# Patient Record
Sex: FEMALE | Race: ASIAN | NOT HISPANIC OR LATINO | Employment: UNEMPLOYED | URBAN - METROPOLITAN AREA
[De-identification: names, ages, dates, MRNs, and addresses within clinical notes are randomized per-mention and may not be internally consistent; named-entity substitution may affect disease eponyms.]

---

## 2019-04-05 ENCOUNTER — COMMUNICATION - HEALTHEAST (OUTPATIENT)
Dept: INFUSION THERAPY | Facility: HOSPITAL | Age: 24
End: 2019-04-05

## 2019-04-22 ENCOUNTER — OFFICE VISIT - HEALTHEAST (OUTPATIENT)
Dept: ONCOLOGY | Facility: HOSPITAL | Age: 24
End: 2019-04-22

## 2019-04-22 ENCOUNTER — COMMUNICATION - HEALTHEAST (OUTPATIENT)
Dept: TELEHEALTH | Facility: CLINIC | Age: 24
End: 2019-04-22

## 2019-04-22 ENCOUNTER — AMBULATORY - HEALTHEAST (OUTPATIENT)
Dept: INFUSION THERAPY | Facility: HOSPITAL | Age: 24
End: 2019-04-22

## 2019-04-22 DIAGNOSIS — D50.9 MICROCYTIC ANEMIA: ICD-10-CM

## 2019-04-22 LAB
ALBUMIN SERPL-MCNC: 3.6 G/DL (ref 3.5–5)
ALP SERPL-CCNC: 89 U/L (ref 45–120)
ALT SERPL W P-5'-P-CCNC: 9 U/L (ref 0–45)
ANION GAP SERPL CALCULATED.3IONS-SCNC: 11 MMOL/L (ref 5–18)
AST SERPL W P-5'-P-CCNC: 16 U/L (ref 0–40)
BASOPHILS # BLD AUTO: 0 THOU/UL (ref 0–0.2)
BASOPHILS # BLD AUTO: 0 THOU/UL (ref 0–0.2)
BASOPHILS NFR BLD AUTO: 0 % (ref 0–2)
BASOPHILS NFR BLD AUTO: 0 % (ref 0–2)
BILIRUB SERPL-MCNC: 0.3 MG/DL (ref 0–1)
BUN SERPL-MCNC: 7 MG/DL (ref 8–22)
CALCIUM SERPL-MCNC: 9.5 MG/DL (ref 8.5–10.5)
CHLORIDE BLD-SCNC: 105 MMOL/L (ref 98–107)
CO2 SERPL-SCNC: 21 MMOL/L (ref 22–31)
CREAT SERPL-MCNC: 0.56 MG/DL (ref 0.6–1.1)
EOSINOPHIL # BLD AUTO: 0.1 THOU/UL (ref 0–0.4)
EOSINOPHIL # BLD AUTO: 0.1 THOU/UL (ref 0–0.4)
EOSINOPHIL NFR BLD AUTO: 1 % (ref 0–6)
EOSINOPHIL NFR BLD AUTO: 1 % (ref 0–6)
ERYTHROCYTE [DISTWIDTH] IN BLOOD BY AUTOMATED COUNT: 15.9 % (ref 11–14.5)
ERYTHROCYTE [DISTWIDTH] IN BLOOD BY AUTOMATED COUNT: 15.9 % (ref 11–14.5)
FERRITIN SERPL-MCNC: 27 NG/ML (ref 10–130)
FOLATE SERPL-MCNC: 13 NG/ML
GFR SERPL CREATININE-BSD FRML MDRD: >60 ML/MIN/1.73M2
GLUCOSE BLD-MCNC: 83 MG/DL (ref 70–125)
HCT VFR BLD AUTO: 31.7 % (ref 35–47)
HCT VFR BLD AUTO: 31.7 % (ref 35–47)
HGB BLD-MCNC: 9.8 G/DL (ref 12–16)
HGB BLD-MCNC: 9.8 G/DL (ref 12–16)
IRON SATN MFR SERPL: 19 % (ref 20–50)
IRON SERPL-MCNC: 86 UG/DL (ref 42–175)
LYMPHOCYTES # BLD AUTO: 1.6 THOU/UL (ref 0.8–4.4)
LYMPHOCYTES # BLD AUTO: 1.6 THOU/UL (ref 0.8–4.4)
LYMPHOCYTES NFR BLD AUTO: 15 % (ref 20–40)
LYMPHOCYTES NFR BLD AUTO: 15 % (ref 20–40)
MCH RBC QN AUTO: 23.3 PG (ref 27–34)
MCH RBC QN AUTO: 23.3 PG (ref 27–34)
MCHC RBC AUTO-ENTMCNC: 30.9 G/DL (ref 32–36)
MCHC RBC AUTO-ENTMCNC: 30.9 G/DL (ref 32–36)
MCV RBC AUTO: 76 FL (ref 80–100)
MCV RBC AUTO: 76 FL (ref 80–100)
MONOCYTES # BLD AUTO: 0.6 THOU/UL (ref 0–0.9)
MONOCYTES # BLD AUTO: 0.6 THOU/UL (ref 0–0.9)
MONOCYTES NFR BLD AUTO: 6 % (ref 2–10)
MONOCYTES NFR BLD AUTO: 6 % (ref 2–10)
NEUTROPHILS # BLD AUTO: 7.9 THOU/UL (ref 2–7.7)
NEUTROPHILS # BLD AUTO: 7.9 THOU/UL (ref 2–7.7)
NEUTROPHILS NFR BLD AUTO: 78 % (ref 50–70)
NEUTROPHILS NFR BLD AUTO: 78 % (ref 50–70)
PATH REPORT.MICROSCOPIC SPEC OTHER STN: ABNORMAL
PLATELET # BLD AUTO: 241 THOU/UL (ref 140–440)
PLATELET # BLD AUTO: 241 THOU/UL (ref 140–440)
PMV BLD AUTO: 9.3 FL (ref 8.5–12.5)
PMV BLD AUTO: 9.3 FL (ref 8.5–12.5)
POTASSIUM BLD-SCNC: 4 MMOL/L (ref 3.5–5)
PROT SERPL-MCNC: 7.4 G/DL (ref 6–8)
RBC # BLD AUTO: 4.2 MILL/UL (ref 3.8–5.4)
RBC # BLD AUTO: 4.2 MILL/UL (ref 3.8–5.4)
RETICS # AUTO: 0.12 MILL/UL (ref 0.01–0.11)
SODIUM SERPL-SCNC: 137 MMOL/L (ref 136–145)
TIBC SERPL-MCNC: 446 UG/DL (ref 313–563)
TRANSFERRIN SERPL-MCNC: 357 MG/DL (ref 212–360)
VIT B12 SERPL-MCNC: 358 PG/ML (ref 213–816)
WBC: 10.2 THOU/UL (ref 4–11)
WBC: 10.2 THOU/UL (ref 4–11)

## 2019-04-22 RX ORDER — FERROUS SULFATE 325(65) MG
1 TABLET ORAL DAILY
Refills: 3 | Status: SHIPPED | COMMUNITY
Start: 2019-03-05 | End: 2024-02-07

## 2019-04-22 ASSESSMENT — MIFFLIN-ST. JEOR: SCORE: 1070

## 2019-04-23 LAB
LAB AP CHARGES (HE HISTORICAL CONVERSION): NORMAL
PATH REPORT.COMMENTS IMP SPEC: NORMAL
PATH REPORT.COMMENTS IMP SPEC: NORMAL
PATH REPORT.FINAL DX SPEC: NORMAL
PATH REPORT.RELEVANT HX SPEC: NORMAL

## 2019-04-24 ENCOUNTER — AMBULATORY - HEALTHEAST (OUTPATIENT)
Dept: MATERNAL FETAL MEDICINE | Facility: HOSPITAL | Age: 24
End: 2019-04-24

## 2019-04-24 DIAGNOSIS — O26.90 PREGNANCY, ANTEPARTUM, COMPLICATIONS: ICD-10-CM

## 2019-04-24 LAB
HEMOGLOBIN A2 QUANTITATION: 2.8 % (ref 2.2–3.5)
HEMOGLOBIN ELECTROPHRESIS: NORMAL
HEMOGLOBIN F QUANTITATION: <0.8 % (ref 0–2)
PATH ICD:: NORMAL
REVIEWING PATHOLOGIST: NORMAL

## 2019-05-03 ENCOUNTER — AMBULATORY - HEALTHEAST (OUTPATIENT)
Dept: MATERNAL FETAL MEDICINE | Facility: HOSPITAL | Age: 24
End: 2019-05-03

## 2019-05-08 ENCOUNTER — RECORDS - HEALTHEAST (OUTPATIENT)
Dept: ADMINISTRATIVE | Facility: OTHER | Age: 24
End: 2019-05-08

## 2019-05-08 ENCOUNTER — OFFICE VISIT - HEALTHEAST (OUTPATIENT)
Dept: MATERNAL FETAL MEDICINE | Facility: HOSPITAL | Age: 24
End: 2019-05-08

## 2019-05-09 ENCOUNTER — OFFICE VISIT - HEALTHEAST (OUTPATIENT)
Dept: ONCOLOGY | Facility: HOSPITAL | Age: 24
End: 2019-05-09

## 2019-05-09 DIAGNOSIS — D56.3 ALPHA THALASSEMIA MINOR: ICD-10-CM

## 2019-06-13 ENCOUNTER — OFFICE VISIT - HEALTHEAST (OUTPATIENT)
Dept: FAMILY MEDICINE | Facility: CLINIC | Age: 24
End: 2019-06-13

## 2019-06-13 DIAGNOSIS — R59.1 LYMPHADENOPATHY: ICD-10-CM

## 2019-06-13 DIAGNOSIS — J30.2 SEASONAL ALLERGIES: ICD-10-CM

## 2019-12-14 ENCOUNTER — OFFICE VISIT - HEALTHEAST (OUTPATIENT)
Dept: FAMILY MEDICINE | Facility: CLINIC | Age: 24
End: 2019-12-14

## 2019-12-14 DIAGNOSIS — H92.01 RIGHT EAR PAIN: ICD-10-CM

## 2019-12-14 DIAGNOSIS — R10.9 FLANK PAIN: ICD-10-CM

## 2019-12-14 DIAGNOSIS — R07.81 PLEURITIC CHEST PAIN: ICD-10-CM

## 2019-12-14 LAB
ALBUMIN UR-MCNC: NEGATIVE MG/DL
APPEARANCE UR: CLEAR
BILIRUB UR QL STRIP: NEGATIVE
COLOR UR AUTO: YELLOW
GLUCOSE UR STRIP-MCNC: NEGATIVE MG/DL
HCG UR QL: NEGATIVE
HGB UR QL STRIP: NEGATIVE
KETONES UR STRIP-MCNC: NEGATIVE MG/DL
LEUKOCYTE ESTERASE UR QL STRIP: NEGATIVE
NITRATE UR QL: NEGATIVE
PH UR STRIP: 7 [PH] (ref 5–8)
SP GR UR STRIP: 1.02 (ref 1–1.03)
UROBILINOGEN UR STRIP-ACNC: NORMAL

## 2020-02-11 ENCOUNTER — RECORDS - HEALTHEAST (OUTPATIENT)
Dept: LAB | Facility: CLINIC | Age: 25
End: 2020-02-11

## 2020-02-12 LAB
BASOPHILS # BLD AUTO: 0 THOU/UL (ref 0–0.2)
BASOPHILS NFR BLD AUTO: 1 % (ref 0–2)
EOSINOPHIL # BLD AUTO: 0.1 THOU/UL (ref 0–0.4)
EOSINOPHIL NFR BLD AUTO: 1 % (ref 0–6)
ERYTHROCYTE [DISTWIDTH] IN BLOOD BY AUTOMATED COUNT: 17.6 % (ref 11–14.5)
HCT VFR BLD AUTO: 38.4 % (ref 35–47)
HGB BLD-MCNC: 11.3 G/DL (ref 12–16)
HIV 1+2 AB+HIV1 P24 AG SERPL QL IA: NEGATIVE
LYMPHOCYTES # BLD AUTO: 1.7 THOU/UL (ref 0.8–4.4)
LYMPHOCYTES NFR BLD AUTO: 24 % (ref 20–40)
MCH RBC QN AUTO: 20.9 PG (ref 27–34)
MCHC RBC AUTO-ENTMCNC: 29.4 G/DL (ref 32–36)
MCV RBC AUTO: 71 FL (ref 80–100)
MONOCYTES # BLD AUTO: 0.6 THOU/UL (ref 0–0.9)
MONOCYTES NFR BLD AUTO: 9 % (ref 2–10)
NEUTROPHILS # BLD AUTO: 4.6 THOU/UL (ref 2–7.7)
NEUTROPHILS NFR BLD AUTO: 65 % (ref 50–70)
PLATELET # BLD AUTO: 295 THOU/UL (ref 140–440)
PMV BLD AUTO: 10.5 FL (ref 8.5–12.5)
RBC # BLD AUTO: 5.41 MILL/UL (ref 3.8–5.4)
WBC: 7.1 THOU/UL (ref 4–11)

## 2020-02-13 LAB
ABO/RH(D): NORMAL
ABORH REPEAT: NORMAL
ANTIBODY SCREEN: NEGATIVE
BACTERIA SPEC CULT: NO GROWTH
C TRACH DNA SPEC QL PROBE+SIG AMP: NEGATIVE
HBV SURFACE AG SERPL QL IA: NEGATIVE
N GONORRHOEA DNA SPEC QL NAA+PROBE: NEGATIVE
RUBV IGG SERPL QL IA: POSITIVE
T PALLIDUM AB SER QL: NEGATIVE

## 2020-06-24 ENCOUNTER — RECORDS - HEALTHEAST (OUTPATIENT)
Dept: LAB | Facility: CLINIC | Age: 25
End: 2020-06-24

## 2020-06-25 LAB — T PALLIDUM AB SER QL: NEGATIVE

## 2020-08-09 ENCOUNTER — HOSPITAL ENCOUNTER (OUTPATIENT)
Dept: OBGYN | Facility: HOSPITAL | Age: 25
Discharge: HOME | End: 2020-08-09
Attending: FAMILY MEDICINE | Admitting: FAMILY MEDICINE

## 2020-08-09 LAB
ALBUMIN UR-MCNC: ABNORMAL MG/DL
APPEARANCE UR: CLEAR
BACTERIA #/AREA URNS HPF: ABNORMAL HPF
BILIRUB UR QL STRIP: NEGATIVE
COLOR UR AUTO: YELLOW
GLUCOSE UR STRIP-MCNC: NEGATIVE MG/DL
HGB UR QL STRIP: NEGATIVE
KETONES UR STRIP-MCNC: NEGATIVE MG/DL
LEUKOCYTE ESTERASE UR QL STRIP: ABNORMAL
NITRATE UR QL: NEGATIVE
PH UR STRIP: 7.5 [PH] (ref 4.5–8)
RBC #/AREA URNS AUTO: ABNORMAL HPF
SP GR UR STRIP: 1.01 (ref 1–1.03)
SQUAMOUS #/AREA URNS AUTO: ABNORMAL LPF
UROBILINOGEN UR STRIP-ACNC: ABNORMAL
WBC #/AREA URNS AUTO: ABNORMAL HPF

## 2020-08-09 ASSESSMENT — MIFFLIN-ST. JEOR: SCORE: 1105.16

## 2020-08-10 LAB
ALLERGIC TO PENICILLIN: NORMAL
BACTERIA SPEC CULT: NO GROWTH
GP B STREP DNA SPEC QL NAA+PROBE: NEGATIVE

## 2020-08-12 ENCOUNTER — RECORDS - HEALTHEAST (OUTPATIENT)
Dept: ADMINISTRATIVE | Facility: OTHER | Age: 25
End: 2020-08-12

## 2020-08-18 ENCOUNTER — RECORDS - HEALTHEAST (OUTPATIENT)
Dept: LAB | Facility: CLINIC | Age: 25
End: 2020-08-18

## 2020-08-20 LAB
ALLERGIC TO PENICILLIN: NORMAL
GP B STREP DNA SPEC QL NAA+PROBE: NEGATIVE

## 2020-08-31 ENCOUNTER — COMMUNICATION - HEALTHEAST (OUTPATIENT)
Dept: SCHEDULING | Facility: CLINIC | Age: 25
End: 2020-08-31

## 2021-01-01 ENCOUNTER — TRANSFERRED RECORDS (OUTPATIENT)
Dept: MULTI SPECIALTY CLINIC | Facility: CLINIC | Age: 26
End: 2021-01-01

## 2021-01-01 LAB — PAP SMEAR - HIM PATIENT REPORTED: NORMAL

## 2021-05-27 NOTE — TELEPHONE ENCOUNTER
With assistance of [facility]  Krishna # [id number] telephoned and spoke with male identifying self as Kahlil Emmanuel, patient spouse.  Benign hematology consult for alpha thalassemia in pregnancy ordered [facility]      Appointment created for Monday, 4/22/19 at 3:00pm (2:30 arrival) with Dr. Huddleston.  Welcomed letter, health history form, medications/allergies list, and person-to-person communication forms will be sent in [place] mail to home address. Kori Jameson RN

## 2021-05-28 NOTE — PROGRESS NOTES
[facility] Hematology and Oncology Progress Note    Patient: Shayna Jerry  MRN: [id number]  Date of Service: 05/09/2019        Reason for Visit    Chief Complaint   Patient presents with     Benign Hematology     Microcytic anemia       Assessment and Plan    Alpha thalassemia minor  Patient is 7 months pregnant    Lab work results are reviewed and showed no evidence of B12, folate or iron deficiency.  Complete metabolic profile is normal.  Reticulocyte count is elevated.  Hemoglobin electrophoresis is consistent with alpha thalassemia minor.    This explains patient's microcytic anemia and her hemoglobin is likely to remain between 9 and 10 g/dL.  No specific intervention is required during or after her pregnancy.    Explained to patient and  that their kids could be at risk for more significant types of alpha thalassemia especially if the  has a carrier state.  Therefore would recommend referral to genetics for further evaluation and for future prenatal counseling.    No follow-up recommended in hematology.    Plan: Referral to genetics  No follow-up in hematology          ECOG Performance   ECOG Performance Status: 1    Distress Assessment  Distress Assessment Score: No distress    Pain           Problem List    1. Alpha thalassemia minor  Ambulatory referral to Genetics        CC: Provider, No Primary Care    ______________________________________________________________________________    History of Present Illness    Ms. Shayna Jerry returns for follow-up.  She was seen 2 weeks ago.  She has had blood work to evaluate anemia and is here to review results.  Doing fine with no new problems.    Pain Status  Currently in Pain: No/denies    Review of Systems    Constitutional  Constitutional (WDL): Exceptions to WDL  Fatigue: Fatigue relieved by rest  Neurosensory  Neurosensory (WDL): All neurosensory elements are within defined limits  Cardiovascular  Cardiovascular (WDL): All cardiovascular elements are  within defined limits  Pulmonary  Respiratory (WDL): Within Defined Limits(Reports sore throat. A lot of mucous. Able to cough it up and out. )  Gastrointestinal  Gastrointestinal (WDL): Exceptions to WDL  Constipation: Occasional or intermittent symptoms, occasional use of stool softeners, laxatives, dietary modification, or enema  Genitourinary  Genitourinary (WDL): All genitourinary elements are within defined limits  Integumentary  Integumentary (WDL): All integumentary elements are within defined limits  Patient Coping  Patient Coping: Accepting  Distress Assessment  Distress Assessment Score: No distress  Accompanied by  Accompanied by: Family Member    Past History  History reviewed. No pertinent past medical history.    History reviewed. No pertinent surgical history.    Physical Exam    Recent Vitals 5/9/2019   Height -   Weight 98 lbs 11 oz   BSA (m2) 1.34 m2   /59   Pulse 84   Temp 97.9   Temp src 1   SpO2 99       GENERAL: Alert and oriented. Seated comfortably. In no distress.    HEAD: Atraumatic and normocephalic.  Has a full head of hair.    EYES: ABEL, EOMI.  No pallor.  No icterus.    Oral cavity: no mucosal lesion or tonsillar enlargement.    NECK: supple. JVP normal.  No thyroid enlargement.    LYMPH NODES: No palpable, cervical, axillary or inguinal lymphadenopathy.    CHEST: clear to auscultation bilaterally.  Resonant to percussion throughout bilaterally.  Symmetrical breath movements bilaterally.    CVS: S1 and S2 are heard. Regular rate and rhythm.  No murmur or gallop or rub heard.    ABDOMEN: Soft. Not tender. Not distended.  No palpable hepatomegaly or splenomegaly.  No other mass palpable.  Bowel sounds heard.    EXTREMITIES: Warm.  No peripheral edema.    SKIN: no rash, or bruising or purpura.        Lab Results    No results found for this or any previous visit (from the past 168 hour(s)).    Imaging    No results found.      Signed by: Christopher Huddleston MD

## 2021-05-28 NOTE — CONSULTS
[facility] Hematology and Oncology Consult Note    Patient: Shayna THOMPSON MI Claritza  MRN: [id number]  Date of Service: 04/22/2019        Reason for Visit    I was consulted by Lana Ortez regarding alpha thalassemia trait    Assessment/Plan    Microcytic anemia, probable thalassemia  Patient is 7 months pregnant    Patient had lab work in March showing microcytic anemia.  Hemoglobin 9.1 and MCV 71 with normal RBC count, white blood cell count and platelet count suggesting likely thalassemia.    Patient has no history of transfusions, jaundice, bone problems, endocrine, cardiac or pulmonary or liver issues.  No history of blood clots or skin ulcers.     denies personal or family history of anemia or thalassemia.    Will do anemia workup to rule out other nutritional causes for her anemia as her anemia does seem more pronounced than I would expect with the MCV.  We will see patient back in a couple of weeks to review results and make further recommendations.    If she is confirmed to have thalassemia then referral to genetics for counseling should be considered.    Patient and 's questions are answered.    Plan:  Check CBC, CMP, reticulocyte count, iron studies, B12, folate and morphology  We will also do hemoglobin electrophoresis  Follow-up in 2 weeks to review      ECOG Performance   ECOG Performance Status: 1  Distress Assessment  Distress Assessment Score: No distress        Problem List    1. Microcytic anemia  HM1(CBC and Differential)    Morphology,Smear Review (MORP)    Reticulocytes    Ferritin    Iron and Transferrin Iron Binding Capacity    Folate, Serum    Vitamin B12    Comprehensive Metabolic Panel    Hemoglobinopathy/Thalassemia Titus    Comprehensive Metabolic Panel    Vitamin B12    Folate, Serum    Iron and Transferrin Iron Binding Capacity    Ferritin    Reticulocytes    HM1(CBC and Differential)    HM1 (CBC with Diff)           CC: Provider, No Primary  Care      ______________________________________________________________________________      Staging History    Cancer Staging  No matching staging information was found for the patient.    History    Ms. Corral NO MI Claritza patient is a very pleasant 23-year-old who is 7 months pregnant and referred for possible alpha thalassemia trait.  Had OB appointment back in March with lab work showing hemoglobin of 9.1 and MCV of 71 with normal WBC count and platelet count.  Originally from [facility] and so there is concern for thalassemia and she is referred.    Patient has no previous knowledge of a diagnosis of thalassemia or anemia.  Has never required blood transfusion.  Denies any family history of thalassemia or anyone requiring transfusions.  Denies jaundice.  Has some achiness in the bones.  No history of heart, lung or liver problems.  No history of blood clots.  No history of skin ulcerations.    Past medical history is negative.  No previous surgeries or hospitalizations.    She is  and accompanied by her  today.  She is about 7 months pregnant currently.    Does not smoke or drink.    Does have some general muscle weakness and some shortness of breath and lightheadedness on occasion.  Describes heat and cold intolerance.  Some nausea.  Difficulty laying flat.  Occasional headaches and dizziness and numbness.  Some difficulty with sleeping.    Past History  History reviewed. No pertinent past medical history.  History reviewed. No pertinent surgical history.  History reviewed. No pertinent family history.  Social History     Socioeconomic History     Marital status: Single     Spouse name: None     Number of children: None     Years of education: None     Highest education level: None   Occupational History     None   Social Needs     Financial resource strain: None     Food insecurity:     Worry: None     Inability: None     Transportation needs:     Medical: None     Non-medical: None   Tobacco Use      Smoking status: Never Smoker     Smokeless tobacco: Never Used   Substance and Sexual Activity     Alcohol use: Not Currently     Frequency: Never     Drug use: Never     Sexual activity: Not Currently   Lifestyle     Physical activity:     Days per week: None     Minutes per session: None     Stress: None   Relationships     Social connections:     Talks on phone: None     Gets together: None     Attends Worship service: None     Active member of club or organization: None     Attends meetings of clubs or organizations: None     Relationship status: None     Intimate partner violence:     Fear of current or ex partner: None     Emotionally abused: None     Physically abused: None     Forced sexual activity: None   Other Topics Concern     None   Social History Narrative     None     Allergies    No Known Allergies    Review of Systems    General  General (WDL): Exceptions to WDL  Fatigue: Yes - Chronic (Greater than 3 months)  Generalized Muscle Weakness: Yes - Recent (Less than 3 months)  ENT  ENT (WDL): Exceptions to WDL  Pain/Pressure in ears: Yes - Chronic (Greater than 3 months)  Sinus Congestion/Drainage: Yes - Chronic (Greater than 3 months)  Sore Throat: Yes - Chronic (Greater than 3 months)  Dental Problems: Yes - Chronic (Greater than 3 months)  Respiratory  Respiratory (WDL): Exceptions to WDL  Dyspnea: Yes - Recent (Less than 3 months)  Cardiovascular  Cardiovascular (WDL): Exceptions to WDL  Lightheadedness: Yes - Recent (Less than 3 months)  Endocrine  Endocrine (WDL): Exceptions to WDL  Heat Intolerance: Yes - Recent (Less than 3 months)  Cold Intolerance: Yes - Recent (Less than 3 months)  Excessive Urination: Yes - Chronic (Greater than 3 months)  Gastrointestinal  Gastrointestinal (WDL): Exceptions to WDL  Constipation: Yes - Chronic (Greater than 3 months)  Blood from rectum: Yes - Recent (Less than 3 months)  Nausea and Vomiting: Yes - Recent (Less than 3 months)  Abdominal Pain: Yes - Chronic  "(Greater than 3 months)  Musculoskeletal  Musculoskeletal (WDL): Exceptions to WDL  Range of Motion Limitation: Yes - Recent (Less than 3 months)  Joint pain: Yes - Chronic (Greater than 3 months)  Back Pain: Yes - Chronic (Greater than 3 months)  Activity Assistance: Yes - Recent (Less than 3 months)  Difficulty to lie flat for more than 30 minutes: Yes - Recent (Less than 3 months)  Pain interfering with walking: Yes - Recent (Less than 3 months)  Muscle pain or stiffness: Yes - Recent (Less than 3 months)  Neurological  Neurological (WDL): Exceptions to WDL  Headaches: Yes - Recent (Less than 3 months)  Dominant Hand: Right  Numbness and/or tingling: Yes - Recent (Less than 3 months)  Psychological/Emotional  Psychological/Emotional (WDL): Exceptions to WDL  Insomnia: Yes - Recent (Less than 3 months)  Daytime sleepiness: Yes - Recent (Less than 3 months)  Hematological/Lymphatic  Hematological/Lymphatic (WDL): Exceptions to WDL  Bleeding gums: Yes - Chronic (Greater than 3 months)  Swollen glands: Yes - Recent (Less than 3 months)  Dermatological  Dermatologic (WDL): Exceptions to WDL  Hair Loss: Yes - Recent (Less than 3 months)  Genitourinary/Reproductive  Genitourinary/Reproductive (WDL): Exceptions to WDL  Urinary Frequency: Yes - Chronic (Greater than 3 months)  Urination more than 2 times a night: Yes - Chronic (Greater than 3 months)  Urinary Urgency: Yes - Chronic (Greater than 3 months)  Reproductive (Females only)  Menstrual irritation or increase in discharge: Yes  Age at start of periods: 15  Last menstural cycle: 10/16/18  Number of pregnancies: 1  Age at first pregnancy: 23  Patient Pregnant?: Yes  Is the patient trying to get pregnant?: Yes  Is the patient on birth control: No  Pain  Currently in Pain: No/denies    Physical Exam    Recent Vitals 4/22/2019   Height 4' 8.5\"   Weight 99 lbs   BSA (m2) 1.34 m2   BP 90/60   Pulse 88   Temp 98   Temp src 1   SpO2 100       GENERAL: Alert and oriented. " Seated comfortably. In no distress.    HEAD: Atraumatic and normocephalic.  Has a full head of hair.    EYES: ABEL, EOMI.  No pallor.  No icterus.    Oral cavity: no mucosal lesion or tonsillar enlargement.    NECK: supple. JVP normal.  No thyroid enlargement.    LYMPH NODES: No palpable, cervical, axillary or inguinal lymphadenopathy.    CHEST: clear to auscultation bilaterally.  Resonant to percussion throughout bilaterally.  Symmetrical breath movements bilaterally.    CVS: S1 and S2 are heard. Regular rate and rhythm.  No murmur or gallop or rub heard.    ABDOMEN: Soft. Not tender. Not distended.  No palpable hepatomegaly or splenomegaly.  No other mass palpable.  Bowel sounds heard.    EXTREMITIES: Warm.  No peripheral edema.    SKIN: no rash, or bruising or purpura.    CNS: Nonfocal    Lab Results    CBC shows white count 9.3 hemoglobin 9.1 MCV 71 platelets 257 with normal differential.  Imaging Results    No results found.      Signed by: Christopher Huddleston MD

## 2021-05-29 NOTE — PROGRESS NOTES
Assessment:     1. Lymphadenopathy     2. Seasonal allergies  loratadine (CLARITIN) 10 mg tablet          Plan:     Differential diagnosis include but not limited to lymphadenopathy, seasonal allergies, foreign checked in the eye.  Discussed with the patient on exam I did not find any indication for foreign checked in the eye.  Her symptoms are more consistent with seasonal allergies.  Will try loratadine 10 mg daily.  Advised patient to monitor for worsening symptoms.  For the lymphadenopathy discussed with the patient this is possibly due to a viral infection therefore to monitor for worsening symptoms.  I discussed red flag symptoms, return precautions to clinic/ER and follow up care with patient/parent.  Expected clinical course, symptomatic cares advised. Questions answered. Patient/parent amenable with plan.       Subjective:       23 y.o. female presents for evaluation of swelling of the eyes.  Patient reports that it started on the right side and now is in both eyes.  He has been experiencing symptoms for about 2 or 3 days.  She denies any pain, she admits to itching and feels like there saw.  She has been rubbing her eyes.  She has not taken any medications for her symptoms.  She used some eyedrops this morning but does not feel like it give her any symptom relief.  She has never had any allergies.  Because of the swelling she is not able to see well.  Denies any nausea, vomiting, diarrhea.  Patient is pregnant about 32 weeks.  Patient also admits that she feels like she has a lump under left side of her cheek.  This came on about the same times her eyes that are giving her problems.      The following portions of the patient's history were reviewed and updated as appropriate: allergies, current medications, past family history, past medical history, past social history, past surgical history and problem list.    Review of Systems  A 12 point comprehensive review of systems was negative except as noted.       Objective:      BP 96/63   Pulse 67   Temp 98  F (36.7  C) (Oral)   Resp 18   Wt 102 lb (46.3 kg)   LMP 10/16/2018   SpO2 99%   BMI 22.46 kg/m    General appearance: alert, appears stated age, cooperative and moderate distress  Head: Normocephalic, without obvious abnormality, atraumatic  Eyes: positive findings: eyelids/periorbital: Swollen eyelids  Ears: normal TM's and external ear canals both ears  Nose: Nares normal. Septum midline. Mucosa normal. No drainage or sinus tenderness.  Throat: lips, mucosa, and tongue normal; teeth and gums normal  Neck: no adenopathy, no carotid bruit, no JVD, supple, symmetrical, trachea midline and thyroid not enlarged, symmetric, no tenderness/mass/nodules  Lungs: clear to auscultation bilaterally  Heart: regular rate and rhythm, S1, S2 normal, no murmur, click, rub or gallop  Extremities: extremities normal, atraumatic, no cyanosis or edema  Pulses: 2+ and symmetric  Skin: Skin color, texture, turgor normal. No rashes or lesions  Lymph nodes: Cervical adenopathy: left side  Neurologic: Grossly normal     This note has been dictated using voice recognition software. Any grammatical or context distortions are unintentional and inherent to the software

## 2021-06-03 VITALS — WEIGHT: 98.7 LBS | BODY MASS INDEX: 21.74 KG/M2

## 2021-06-03 VITALS — WEIGHT: 102 LBS | BODY MASS INDEX: 22.46 KG/M2

## 2021-06-03 VITALS — WEIGHT: 99 LBS | HEIGHT: 57 IN | BODY MASS INDEX: 21.36 KG/M2

## 2021-06-04 VITALS
RESPIRATION RATE: 12 BRPM | TEMPERATURE: 97.9 F | DIASTOLIC BLOOD PRESSURE: 67 MMHG | SYSTOLIC BLOOD PRESSURE: 101 MMHG | WEIGHT: 94.8 LBS | HEART RATE: 77 BPM | BODY MASS INDEX: 20.51 KG/M2 | OXYGEN SATURATION: 99 %

## 2021-06-04 VITALS — WEIGHT: 105 LBS | HEIGHT: 57 IN | BODY MASS INDEX: 22.65 KG/M2

## 2021-06-04 NOTE — PROGRESS NOTES
Chief Complaint   Patient presents with     Ear pain     has been going on for awhile; pt was seen [facility] and was given ABx, symptoms ongoing      Hip/flank     on and off; right side       Chest Pain     x1 week        HPI:  Shayna Jerry is a 23 y.o. female with PMHx of anemia who presents today complaining of right ear pain x multiple months. She was seen at [facility] for it and she was prescribed an antibiotic about 3 months ago, but the symptoms have been ongoing. She denies any hearing changes, ear discharge, fevers, or cough. She does have some nasal congestions.     She has also been having right side/flank pain. She had this pain during her last pregnancy about 1 year ago. It got better, but then it returned yesterday. The last time she had it she had a UTI. She denies any dysuria or urinary frequency or blood in the urine.     She has also been having chest pain that has been ongoing for about one week. The pain is right under the left breast. The pain feels like a stabbing pain. The pain comes on with twisting and sometimes with deep breathing. The pain is not present right now. She denies any wheezing, but when the stabbing pain comes on she finds it hard to breath. She has never had this type of pain before. The pains come on a few times per day. He denies any leg swelling or leg pain. No PMHx of PE.     History obtained from the patient.    Problem List:  2019-07: Single liveborn infant delivered vaginally  2019-07: Pregnant  2019-05: Alpha thalassemia minor  2019-04: Microcytic anemia      No past medical history on file.    Social History     Tobacco Use     Smoking status: Never Smoker     Smokeless tobacco: Never Used   Substance Use Topics     Alcohol use: Not Currently     Frequency: Never       Review of Systems   Constitutional: Negative for fever.   HENT: Positive for congestion and ear pain (right). Negative for ear discharge and hearing loss.    Respiratory: Negative for cough,  shortness of breath and wheezing.    Cardiovascular: Negative for leg swelling.   Gastrointestinal: Positive for nausea (late at night). Negative for abdominal pain, constipation, diarrhea and vomiting.   Genitourinary: Positive for flank pain. Negative for dysuria, frequency and hematuria.   Skin: Negative for rash.       Vitals:    12/14/19 1509   BP: 101/67   Patient Site: Right Arm   Patient Position: Sitting   Cuff Size: Adult Regular   Pulse: 77   Resp: 12   Temp: 97.9  F (36.6  C)   TempSrc: Oral   SpO2: 99%   Weight: (!) 94 lb 12.8 oz (43 kg)       Physical Exam  Vitals signs and nursing note reviewed. Exam conducted with a chaperone present (Patient's , he is interpreting).   Constitutional:       General: She is not in acute distress.     Appearance: Normal appearance. She is well-developed. She is not diaphoretic.   HENT:      Head: Normocephalic and atraumatic.      Right Ear: Tympanic membrane, ear canal and external ear normal.      Left Ear: Tympanic membrane, ear canal and external ear normal.      Nose: No congestion.      Mouth/Throat:      Pharynx: No oropharyngeal exudate or posterior oropharyngeal erythema.   Eyes:      General:         Right eye: No discharge.         Left eye: No discharge.      Conjunctiva/sclera: Conjunctivae normal.   Neck:      Musculoskeletal: Normal range of motion.   Cardiovascular:      Rate and Rhythm: Normal rate and regular rhythm.      Heart sounds: Normal heart sounds. No murmur. No friction rub.   Pulmonary:      Effort: Pulmonary effort is normal. No respiratory distress.      Breath sounds: Normal breath sounds. No stridor. No wheezing, rhonchi or rales.   Abdominal:      General: Abdomen is flat. Bowel sounds are normal. There is no distension.      Tenderness: There is abdominal tenderness in the right lower quadrant. There is right CVA tenderness. There is no left CVA tenderness or guarding. Negative signs include Rovsing's sign, psoas sign and  obturator sign.   Lymphadenopathy:      Cervical: No cervical adenopathy.   Neurological:      Mental Status: She is alert.   Psychiatric:         Behavior: Behavior normal.         Thought Content: Thought content normal.         Judgment: Judgment normal.           Labs:  Recent Results (from the past 72 hour(s))   Urinalysis-UC if Indicated   Result Value Ref Range    Color, UA Yellow Colorless, Yellow, Straw, Light Yellow    Clarity, UA Clear Clear    Glucose, UA Negative Negative    Bilirubin, UA Negative Negative    Ketones, UA Negative Negative    Specific Gravity, UA 1.020 1.005 - 1.030    Blood, UA Negative Negative    pH, UA 7.0 5.0 - 8.0    Protein, UA Negative Negative mg/dL    Urobilinogen, UA 1.0 E.U./dL 0.2 E.U./dL, 1.0 E.U./dL    Nitrite, UA Negative Negative    Leukocytes, UA Negative Negative   Pregnancy (Beta-hCG, Qual), Urine   Result Value Ref Range    Pregnancy Test, Urine Negative Negative       Radiology:  I have personally ordered and preliminarily reviewed the following xray, I have noted no signs of severe constipation or pneumothorax or pneumonia.  Xr Chest 2 Views    Result Date: 12/14/2019  EXAM DATE:         12/14/2019 EXAM: X-RAY CHEST, 2 VIEWS, FRONTAL AND LATERAL LOCATION: [facility] DATE/TIME: 12/14/2019 3:45 PM INDICATION: Left lower pleuritic chest pain. COMPARISON: None IMPRESSION: Negative chest. No pleural effusion or pneumothorax.     Xr Abdomen Ap    Result Date: 12/14/2019  EXAM DATE:         12/14/2019 EXAM: X-RAY ABDOMEN, AP LOCATION: [facility] DATE/TIME: 12/14/2019 4:00 PM INDICATION: Left lower pleuritic chest pain. COMPARISON: None. IMPRESSION: Negative abdomen. Moderate amount stool throughout the colon.. Nothing for obstruction or free air. No evidence for renal stones.       Clinical Decision Making:  Patient experiencing right ear pain for multiple months.  No current signs of infection.  Recommend beginning Flonase in case this  is eustachian tube dysfunction.  Instructed to follow-up with ENT for further evaluation of this chronic pain.    Patient experiencing left sided chest pain just under the left breast.  It is intermittent and pleuritic in nature.  Chest x-ray was clear for any signs of pneumothorax or pneumonia.  She is PERC negative and low risk for pulmonary embolism, heart and lung exam is normal.  Recommend follow-up with primary care for further investigation into this also consider the possibility for constipation causing gas pains, however abdominal x-ray is relatively normal.  Patient is stable for discharge.    Patient also experiencing right lower quadrant abdominal pain that is been present for 1 day.  UA is negative for any signs of UTI and there is no blood, making it unlikely for there to be kidney stone.  UPT was negative, so ectopic pregnancy is ruled out.  Considered possibility for appendicitis, however the patient's abdominal exam is relatively benign with no positive special exam findings.  Recommend close follow-up for further evaluation as this could be ovarian cyst, muscular pain, constipation, or diverticulitis (unlikely given the patient's age).  At the end of the encounter, I discussed results, diagnosis, medications. Discussed red flags for immediate return to clinic/ER, as well as indications for follow up if no improvement. Patient understood and agreed to plan. Patient was stable for discharge.    1. Flank pain  Urinalysis-UC if Indicated    Pregnancy (Beta-hCG, Qual), Urine    XR Abdomen AP    acetaminophen (TYLENOL EXTRA STRENGTH) 500 MG tablet   2. Right ear pain  fluticasone propionate (FLONASE ALLERGY RELIEF) 50 mcg/actuation nasal spray    Ambulatory referral to ENT   3. Pleuritic chest pain  XR Chest 2 Views         Patient Instructions   Ear pain.   1.  Begin taking Flonase before bed according to bottle instructions.  2.  You will be contacted by the specialty care  to get you  scheduled with an ear nose and throat specialist.    Right side pain.  1.  Your urine pregnancy test was negative, your urine test showed no signs of infection or blood.  Your abdominal x-ray was not significantly abnormal.  2.  Follow-up with primary care for further discussion and evaluation of this abdominal pain.  3.  Take 1000 mg of Tylenol every 6 hours as needed for pain control.  4.  Seek emergency medical attention if you develop worsening abdominal pain, pain with walking, fever.    Left sided chest pain.  1. Your chest xray was negative.   2. You are low risk for blood clot in the lungs.  3. Follow up for further discussion with primary care. Seek emergency medical attention if symptoms worsen.        40 minutes spent with the patient with greater than 50% of time spent discussing symptoms, treatment options, counseling and/or coordination of care explaining differentials and ruling out emergent causes for left-sided chest pain and right lower quadrant abdominal pain.

## 2021-06-04 NOTE — PATIENT INSTRUCTIONS - HE
Ear pain.   1.  Begin taking Flonase before bed according to bottle instructions.  2.  You will be contacted by the specialty care  to get you scheduled with an ear nose and throat specialist.    Right side pain.  1.  Your urine pregnancy test was negative, your urine test showed no signs of infection or blood.  Your abdominal x-ray was not significantly abnormal.  2.  Follow-up with primary care for further discussion and evaluation of this abdominal pain.  3.  Take 1000 mg of Tylenol every 6 hours as needed for pain control.  4.  Seek emergency medical attention if you develop worsening abdominal pain, pain with walking, fever.    Left sided chest pain.  1. Your chest xray was negative.   2. You are low risk for blood clot in the lungs.  3. Follow up for further discussion with primary care. Seek emergency medical attention if symptoms worsen.

## 2021-06-10 NOTE — PROGRESS NOTES
Discussed with pt and sig. Other the choice to start an iv or give another dose of terbutaline, the have decided to discharge home after sve shows no cervical change.  Reviewed signs and symptoms to return to hospital, [facility] phone number programed into sig. Others phone.

## 2021-06-10 NOTE — PROGRESS NOTES
Shayna and her sig. Other arrived through the ED with C?O ctx that started at about 1200 noon today. They admit to intercourse this morning, denies leaking or bleeding. Urine collected.  Monitors applied

## 2021-06-10 NOTE — PROGRESS NOTES
Rn spoke with Dr Hare, orders received to push po fluids, give 1 dose of terb, collect and send gbs and ua.   Discussed this with pt and sig. Other, she was hesitant on the terbutaline injection but decided after talking through a  delivery to take the 1 injection.

## 2021-06-16 PROBLEM — D56.3 ALPHA THALASSEMIA MINOR: Status: ACTIVE | Noted: 2019-05-09

## 2021-06-16 PROBLEM — D50.9 MICROCYTIC ANEMIA: Status: ACTIVE | Noted: 2019-04-22

## 2021-06-16 PROBLEM — Z34.90 PREGNANT: Status: ACTIVE | Noted: 2019-07-10

## 2021-06-17 NOTE — PATIENT INSTRUCTIONS - HE
Patient Instructions by Diana Camilo CNP at 6/13/2019  1:40 PM     Author: Diana Camilo CNP Service: -- Author Type: Nurse Practitioner    Filed: 6/13/2019  3:19 PM Encounter Date: 6/13/2019 Status: Signed    : Diana Camilo CNP (Nurse Practitioner)         Patient Education     Seasonal Allergy  Seasonal allergy is also called hay fever. It may occur after a person is exposed to pollens released from grasses, weeds, trees and shrubs. This type of allergy occurs during the spring and summer when the pollen contacts the lining of the nose, eyes, eyelids, sinuses and throat. This causes histamine to be released from the tissues. Histamine causes itching and swelling. This may produce a watery discharge from the eyes or nose. Violent sneezing, nasal congestion, post-nasal drip, itching of the eyes, nose, throat and mouth, scratchy throat, and dry cough may also occur.  Home care  Seasonal allergy cannot be cured, but symptoms can be reduced by these measures:    Stay away from or limit your time near the allergen as much as you can:    ? Stay indoors on windy days of pollen season.   ? Keep windows and doors closed. Use air conditioning instead in your home and car. This filters the air.  ? Change air conditioner filters often.  ? Take a shower, wash your hair, and change clothes after being outdoors.  ? Put on a NIOSH-rated 95 filter mask when working outdoors. Before going outside, take your allergy medicine as advised by your healthcare provider.    Decongestant pills and sprays reduce tissue swelling and watery discharge. Overuse of nasal decongestant sprays may make symptoms worse. Do not use these more often than recommended. Sometimes you can experience a rebound effect (symptoms worsen), when stopping them. Talk to your healthcare provider or pharmacist about these medicines before taking them, especially if you have high blood pressure or heart problems.     Antihistamines  block the release of histamine during the allergic response. They work better when taken before symptoms develop. Unless a prescription antihistamine was prescribed, you can take over-the-counter antihistamines that do not cause drowsiness.  Ask your pharmacist for suggestions.    Steroid nasal sprays or oral steroids may also be prescribed for more severe symptoms. These help to reduce the local inflammation that can add to the allergic response.    If you have asthma, pollen season may make your asthma symptoms worse. It is important that you use your asthma medicines as directed during this time to prevent or treat attacks. Some persons with asthma have asthma symptoms that get worse when they take antihistamines. This is due to the drying effect on the lungs. If you notice this, stop the antihistamines, drink extra fluids and notify your doctor.    If you have sinus congestion or drainage, a saline nasal rinse may give relief. A saline nasal rinse lessens the swelling and clears excess mucus. This allows sinuses to drain. Prepackaged kits are sold at most drug stores. These contain pre-mixed salt packets and an irrigation device.  Follow-up care  Follow up with your healthcare provider, or as advised. If you have been referred to a specialist, make an appointment promptly.  When to seek medical advice  Call your healthcare provider right away for any of the following:    Facial, ear or sinus pain; colored drainage from the nose    Headaches    You have asthma and your asthma symptoms do not respond to the usual doses of your medicine    Cough with colored sputum (mucus)    Fever of 100.4 F (38 C) or higher, or as directed by the healthcare provider  Call 911  Call 911 if any of these occur:    Trouble breathing or swallowing, wheezing    Hoarse voice, trouble speaking, or drooling    Confusion    Very drowsy or trouble awakening    Fainting or loss of consciousness    Rapid heart rate, or weak pulse    Low blood  pressure    Feeling of doom    Nausea, vomiting, abdominal pain, diarrhea    Vomiting blood, or large amounts of blood in stool    Seizure    Cold, moist, or pale (blue in color) skin  Date Last Reviewed: 5/1/2017 2000-2017 [employer]. [address] [place] [address]. All rights reserved. This information is not intended as a substitute for professional medical care. Always follow your healthcare professional's instructions.

## 2021-06-19 NOTE — LETTER
Letter by Kori Jameosn RN at      Author: Kori Jameson RN Service: -- Author Type: --    Filed:  Encounter Date: 4/5/2019 Status: (Other)       Dear Shayna Jerry:    Thank you for choosing [facility] for your care.  We are committed to providing you with the highest quality and compassionate healthcare services.  The following information pertains to your first appointment with our clinic.    Date/Time of appointment: Monday, April 22, 2019--please arrive no later than 2:30pm for your 3:00pm consult    Note: We have you arrive 30 minutes prior to your appointment time.  This allows time to complete forms, possible labs and nursing assessment.     Name of your Physician: Christopher Huddleston MD     What to bring to your appointment:    Completed Patient History/Initial Nursing Assessment and Medication/Allergy List (these forms were sent to you).    Any paperwork or films from your physician that we have asked you to bring.    Your current insurance card(s).    Parking:    Please refer to the map included to direct you.  The [facility] is located at the [place] end of [facility] in [place], [place].      After turning onto [address] from [facility], take a right turn at the first stop sign.  We have designated parking on the left, identified as parking for Cancer Care patients (Lot D).     The Code to Enter Lot D is: 0401. This code changes monthly and will always coincide with the current month followed by 01. For example August will be 0801.  The month will continue to change but the 01 will remain constant.  If lot D is full please use Parking Lot A, directly across the street.    Please enter the Cancer Care Center on the north end of the [facility].  You will see a sign on the building.        For Hematology appointments, please take the elevator to the second floor to check in.   We hope these instructions are helpful to you.  If you have any questions or concerns,  please call us at [phone].  It is our pleasure to assist you.    Warm Regards,      Kori Jameson  Nurse Navigator  [phone]

## 2023-07-21 ENCOUNTER — ANESTHESIA EVENT (OUTPATIENT)
Dept: SURGERY | Facility: HOSPITAL | Age: 28
End: 2023-07-21
Payer: COMMERCIAL

## 2023-07-21 ENCOUNTER — HOSPITAL ENCOUNTER (OUTPATIENT)
Facility: HOSPITAL | Age: 28
Discharge: HOME | End: 2023-07-22
Attending: EMERGENCY MEDICINE | Admitting: EMERGENCY MEDICINE
Payer: COMMERCIAL

## 2023-07-21 ENCOUNTER — APPOINTMENT (OUTPATIENT)
Dept: ULTRASOUND IMAGING | Facility: HOSPITAL | Age: 28
End: 2023-07-21
Attending: EMERGENCY MEDICINE
Payer: COMMERCIAL

## 2023-07-21 ENCOUNTER — ANESTHESIA (OUTPATIENT)
Dept: SURGERY | Facility: HOSPITAL | Age: 28
End: 2023-07-21
Payer: COMMERCIAL

## 2023-07-21 DIAGNOSIS — O00.102 LEFT TUBAL PREGNANCY WITHOUT INTRAUTERINE PREGNANCY: Primary | ICD-10-CM

## 2023-07-21 DIAGNOSIS — O00.102 LEFT TUBAL PREGNANCY, UNSPECIFIED WHETHER INTRAUTERINE PREGNANCY PRESENT: ICD-10-CM

## 2023-07-21 LAB
ABO/RH(D): NORMAL
ANION GAP SERPL CALCULATED.3IONS-SCNC: 13 MMOL/L (ref 7–15)
ANTIBODY SCREEN: NEGATIVE
BASOPHILS # BLD AUTO: 0 10E3/UL (ref 0–0.2)
BASOPHILS NFR BLD AUTO: 0 %
BUN SERPL-MCNC: 11 MG/DL (ref 6–20)
CALCIUM SERPL-MCNC: 9.2 MG/DL (ref 8.6–10)
CHLORIDE SERPL-SCNC: 101 MMOL/L (ref 98–107)
CREAT SERPL-MCNC: 0.48 MG/DL (ref 0.51–0.95)
DEPRECATED HCO3 PLAS-SCNC: 22 MMOL/L (ref 22–29)
EOSINOPHIL # BLD AUTO: 0 10E3/UL (ref 0–0.7)
EOSINOPHIL NFR BLD AUTO: 0 %
ERYTHROCYTE [DISTWIDTH] IN BLOOD BY AUTOMATED COUNT: 15.3 % (ref 10–15)
GFR SERPL CREATININE-BSD FRML MDRD: >90 ML/MIN/1.73M2
GLUCOSE SERPL-MCNC: 106 MG/DL (ref 70–99)
HCG INTACT+B SERPL-ACNC: 513 MIU/ML
HCT VFR BLD AUTO: 39.9 % (ref 35–47)
HGB BLD-MCNC: 11.9 G/DL (ref 11.7–15.7)
IMM GRANULOCYTES # BLD: 0 10E3/UL
IMM GRANULOCYTES NFR BLD: 0 %
LYMPHOCYTES # BLD AUTO: 1.1 10E3/UL (ref 0.8–5.3)
LYMPHOCYTES NFR BLD AUTO: 11 %
MCH RBC QN AUTO: 20.8 PG (ref 26.5–33)
MCHC RBC AUTO-ENTMCNC: 29.8 G/DL (ref 31.5–36.5)
MCV RBC AUTO: 70 FL (ref 78–100)
MONOCYTES # BLD AUTO: 0.2 10E3/UL (ref 0–1.3)
MONOCYTES NFR BLD AUTO: 2 %
NEUTROPHILS # BLD AUTO: 8.9 10E3/UL (ref 1.6–8.3)
NEUTROPHILS NFR BLD AUTO: 87 %
NRBC # BLD AUTO: 0 10E3/UL
NRBC BLD AUTO-RTO: 0 /100
PLATELET # BLD AUTO: 264 10E3/UL (ref 150–450)
POTASSIUM SERPL-SCNC: 4.1 MMOL/L (ref 3.4–5.3)
RADIOLOGIST FLAGS: ABNORMAL
RBC # BLD AUTO: 5.71 10E6/UL (ref 3.8–5.2)
SODIUM SERPL-SCNC: 136 MMOL/L (ref 136–145)
SPECIMEN EXPIRATION DATE: NORMAL
WBC # BLD AUTO: 10.3 10E3/UL (ref 4–11)

## 2023-07-21 PROCEDURE — 88305 TISSUE EXAM BY PATHOLOGIST: CPT | Mod: 26 | Performed by: PATHOLOGY

## 2023-07-21 PROCEDURE — 36415 COLL VENOUS BLD VENIPUNCTURE: CPT | Performed by: EMERGENCY MEDICINE

## 2023-07-21 PROCEDURE — 250N000011 HC RX IP 250 OP 636: Performed by: OBSTETRICS & GYNECOLOGY

## 2023-07-21 PROCEDURE — 96374 THER/PROPH/DIAG INJ IV PUSH: CPT

## 2023-07-21 PROCEDURE — 250N000013 HC RX MED GY IP 250 OP 250 PS 637: Performed by: OBSTETRICS & GYNECOLOGY

## 2023-07-21 PROCEDURE — 360N000077 HC SURGERY LEVEL 4, PER MIN: Performed by: OBSTETRICS & GYNECOLOGY

## 2023-07-21 PROCEDURE — 86850 RBC ANTIBODY SCREEN: CPT | Performed by: EMERGENCY MEDICINE

## 2023-07-21 PROCEDURE — 999N000141 HC STATISTIC PRE-PROCEDURE NURSING ASSESSMENT: Performed by: OBSTETRICS & GYNECOLOGY

## 2023-07-21 PROCEDURE — 258N000003 HC RX IP 258 OP 636: Performed by: NURSE ANESTHETIST, CERTIFIED REGISTERED

## 2023-07-21 PROCEDURE — 370N000017 HC ANESTHESIA TECHNICAL FEE, PER MIN: Performed by: OBSTETRICS & GYNECOLOGY

## 2023-07-21 PROCEDURE — 258N000003 HC RX IP 258 OP 636: Performed by: ANESTHESIOLOGY

## 2023-07-21 PROCEDURE — 99285 EMERGENCY DEPT VISIT HI MDM: CPT | Mod: 25

## 2023-07-21 PROCEDURE — 258N000003 HC RX IP 258 OP 636: Performed by: OBSTETRICS & GYNECOLOGY

## 2023-07-21 PROCEDURE — 80048 BASIC METABOLIC PNL TOTAL CA: CPT | Performed by: EMERGENCY MEDICINE

## 2023-07-21 PROCEDURE — 250N000011 HC RX IP 250 OP 636: Performed by: ANESTHESIOLOGY

## 2023-07-21 PROCEDURE — 250N000009 HC RX 250: Performed by: NURSE ANESTHETIST, CERTIFIED REGISTERED

## 2023-07-21 PROCEDURE — 84702 CHORIONIC GONADOTROPIN TEST: CPT | Performed by: EMERGENCY MEDICINE

## 2023-07-21 PROCEDURE — 250N000011 HC RX IP 250 OP 636: Performed by: NURSE ANESTHETIST, CERTIFIED REGISTERED

## 2023-07-21 PROCEDURE — 250N000011 HC RX IP 250 OP 636: Performed by: EMERGENCY MEDICINE

## 2023-07-21 PROCEDURE — 85025 COMPLETE CBC W/AUTO DIFF WBC: CPT | Performed by: EMERGENCY MEDICINE

## 2023-07-21 PROCEDURE — 86901 BLOOD TYPING SEROLOGIC RH(D): CPT | Performed by: EMERGENCY MEDICINE

## 2023-07-21 PROCEDURE — 76801 OB US < 14 WKS SINGLE FETUS: CPT

## 2023-07-21 PROCEDURE — 710N000012 HC RECOVERY PHASE 2, PER MINUTE: Performed by: OBSTETRICS & GYNECOLOGY

## 2023-07-21 PROCEDURE — 710N000009 HC RECOVERY PHASE 1, LEVEL 1, PER MIN: Performed by: OBSTETRICS & GYNECOLOGY

## 2023-07-21 PROCEDURE — 272N000001 HC OR GENERAL SUPPLY STERILE: Performed by: OBSTETRICS & GYNECOLOGY

## 2023-07-21 PROCEDURE — 88305 TISSUE EXAM BY PATHOLOGIST: CPT | Mod: TC | Performed by: OBSTETRICS & GYNECOLOGY

## 2023-07-21 RX ORDER — PROPOFOL 10 MG/ML
INJECTION, EMULSION INTRAVENOUS CONTINUOUS PRN
Status: DISCONTINUED | OUTPATIENT
Start: 2023-07-21 | End: 2023-07-21

## 2023-07-21 RX ORDER — OXYCODONE HYDROCHLORIDE 5 MG/1
5 TABLET ORAL
Status: COMPLETED | OUTPATIENT
Start: 2023-07-21 | End: 2023-07-22

## 2023-07-21 RX ORDER — FENTANYL CITRATE 50 UG/ML
INJECTION, SOLUTION INTRAMUSCULAR; INTRAVENOUS PRN
Status: DISCONTINUED | OUTPATIENT
Start: 2023-07-21 | End: 2023-07-21

## 2023-07-21 RX ORDER — HYDROMORPHONE HYDROCHLORIDE 1 MG/ML
0.5 INJECTION, SOLUTION INTRAMUSCULAR; INTRAVENOUS; SUBCUTANEOUS ONCE
Status: COMPLETED | OUTPATIENT
Start: 2023-07-21 | End: 2023-07-21

## 2023-07-21 RX ORDER — ONDANSETRON 4 MG/1
4 TABLET, ORALLY DISINTEGRATING ORAL EVERY 30 MIN PRN
Status: DISCONTINUED | OUTPATIENT
Start: 2023-07-21 | End: 2023-07-22 | Stop reason: HOSPADM

## 2023-07-21 RX ORDER — DEXAMETHASONE SODIUM PHOSPHATE 4 MG/ML
INJECTION, SOLUTION INTRA-ARTICULAR; INTRALESIONAL; INTRAMUSCULAR; INTRAVENOUS; SOFT TISSUE PRN
Status: DISCONTINUED | OUTPATIENT
Start: 2023-07-21 | End: 2023-07-21

## 2023-07-21 RX ORDER — FENTANYL CITRATE 50 UG/ML
25 INJECTION, SOLUTION INTRAMUSCULAR; INTRAVENOUS EVERY 5 MIN PRN
Status: DISCONTINUED | OUTPATIENT
Start: 2023-07-21 | End: 2023-07-21 | Stop reason: HOSPADM

## 2023-07-21 RX ORDER — AMOXICILLIN 250 MG
1-2 CAPSULE ORAL 2 TIMES DAILY
Qty: 30 TABLET | Refills: 0 | Status: SHIPPED | OUTPATIENT
Start: 2023-07-21 | End: 2024-02-07

## 2023-07-21 RX ORDER — ACETAMINOPHEN 325 MG/1
975 TABLET ORAL EVERY 6 HOURS PRN
Qty: 50 TABLET | Refills: 0 | Status: SHIPPED | OUTPATIENT
Start: 2023-07-21 | End: 2024-02-07

## 2023-07-21 RX ORDER — IBUPROFEN 800 MG/1
800 TABLET, FILM COATED ORAL EVERY 6 HOURS PRN
Qty: 30 TABLET | Refills: 0 | Status: SHIPPED | OUTPATIENT
Start: 2023-07-21 | End: 2024-02-07

## 2023-07-21 RX ORDER — PROPOFOL 10 MG/ML
INJECTION, EMULSION INTRAVENOUS PRN
Status: DISCONTINUED | OUTPATIENT
Start: 2023-07-21 | End: 2023-07-21

## 2023-07-21 RX ORDER — OXYCODONE HYDROCHLORIDE 5 MG/1
10 TABLET ORAL
Status: DISCONTINUED | OUTPATIENT
Start: 2023-07-21 | End: 2023-07-22 | Stop reason: HOSPADM

## 2023-07-21 RX ORDER — HYDROMORPHONE HYDROCHLORIDE 1 MG/ML
0.2 INJECTION, SOLUTION INTRAMUSCULAR; INTRAVENOUS; SUBCUTANEOUS EVERY 5 MIN PRN
Status: DISCONTINUED | OUTPATIENT
Start: 2023-07-21 | End: 2023-07-21 | Stop reason: HOSPADM

## 2023-07-21 RX ORDER — FENTANYL CITRATE 50 UG/ML
50 INJECTION, SOLUTION INTRAMUSCULAR; INTRAVENOUS EVERY 5 MIN PRN
Status: DISCONTINUED | OUTPATIENT
Start: 2023-07-21 | End: 2023-07-21 | Stop reason: HOSPADM

## 2023-07-21 RX ORDER — ONDANSETRON 2 MG/ML
INJECTION INTRAMUSCULAR; INTRAVENOUS PRN
Status: DISCONTINUED | OUTPATIENT
Start: 2023-07-21 | End: 2023-07-21

## 2023-07-21 RX ORDER — ONDANSETRON 4 MG/1
4 TABLET, ORALLY DISINTEGRATING ORAL EVERY 30 MIN PRN
Status: DISCONTINUED | OUTPATIENT
Start: 2023-07-21 | End: 2023-07-21 | Stop reason: HOSPADM

## 2023-07-21 RX ORDER — SODIUM CHLORIDE, SODIUM LACTATE, POTASSIUM CHLORIDE, AND CALCIUM CHLORIDE .6; .31; .03; .02 G/100ML; G/100ML; G/100ML; G/100ML
IRRIGANT IRRIGATION PRN
Status: DISCONTINUED | OUTPATIENT
Start: 2023-07-21 | End: 2023-07-21 | Stop reason: HOSPADM

## 2023-07-21 RX ORDER — ONDANSETRON 2 MG/ML
4 INJECTION INTRAMUSCULAR; INTRAVENOUS EVERY 30 MIN PRN
Status: DISCONTINUED | OUTPATIENT
Start: 2023-07-21 | End: 2023-07-22 | Stop reason: HOSPADM

## 2023-07-21 RX ORDER — HYDROMORPHONE HYDROCHLORIDE 1 MG/ML
0.4 INJECTION, SOLUTION INTRAMUSCULAR; INTRAVENOUS; SUBCUTANEOUS EVERY 5 MIN PRN
Status: DISCONTINUED | OUTPATIENT
Start: 2023-07-21 | End: 2023-07-21 | Stop reason: HOSPADM

## 2023-07-21 RX ORDER — ONDANSETRON 2 MG/ML
4 INJECTION INTRAMUSCULAR; INTRAVENOUS EVERY 30 MIN PRN
Status: DISCONTINUED | OUTPATIENT
Start: 2023-07-21 | End: 2023-07-21 | Stop reason: HOSPADM

## 2023-07-21 RX ORDER — LIDOCAINE HYDROCHLORIDE 10 MG/ML
INJECTION, SOLUTION INFILTRATION; PERINEURAL PRN
Status: DISCONTINUED | OUTPATIENT
Start: 2023-07-21 | End: 2023-07-21

## 2023-07-21 RX ORDER — BUPIVACAINE HYDROCHLORIDE 2.5 MG/ML
INJECTION, SOLUTION INFILTRATION; PERINEURAL PRN
Status: DISCONTINUED | OUTPATIENT
Start: 2023-07-21 | End: 2023-07-21 | Stop reason: HOSPADM

## 2023-07-21 RX ORDER — ACETAMINOPHEN 325 MG/1
975 TABLET ORAL ONCE
Status: DISCONTINUED | OUTPATIENT
Start: 2023-07-22 | End: 2023-07-21

## 2023-07-21 RX ORDER — NALOXONE HYDROCHLORIDE 0.4 MG/ML
0.4 INJECTION, SOLUTION INTRAMUSCULAR; INTRAVENOUS; SUBCUTANEOUS
Status: DISCONTINUED | OUTPATIENT
Start: 2023-07-21 | End: 2023-07-22 | Stop reason: HOSPADM

## 2023-07-21 RX ORDER — ACETAMINOPHEN 325 MG/1
975 TABLET ORAL ONCE
Status: DISCONTINUED | OUTPATIENT
Start: 2023-07-21 | End: 2023-07-21 | Stop reason: HOSPADM

## 2023-07-21 RX ORDER — NALOXONE HYDROCHLORIDE 0.4 MG/ML
0.2 INJECTION, SOLUTION INTRAMUSCULAR; INTRAVENOUS; SUBCUTANEOUS
Status: DISCONTINUED | OUTPATIENT
Start: 2023-07-21 | End: 2023-07-22 | Stop reason: HOSPADM

## 2023-07-21 RX ORDER — OXYCODONE HYDROCHLORIDE 5 MG/1
5-10 TABLET ORAL EVERY 4 HOURS PRN
Qty: 12 TABLET | Refills: 0 | Status: SHIPPED | OUTPATIENT
Start: 2023-07-21 | End: 2024-02-07

## 2023-07-21 RX ORDER — OXYCODONE HYDROCHLORIDE 5 MG/1
5 TABLET ORAL
Status: DISCONTINUED | OUTPATIENT
Start: 2023-07-21 | End: 2023-07-22 | Stop reason: HOSPADM

## 2023-07-21 RX ORDER — SODIUM CHLORIDE, SODIUM LACTATE, POTASSIUM CHLORIDE, CALCIUM CHLORIDE 600; 310; 30; 20 MG/100ML; MG/100ML; MG/100ML; MG/100ML
INJECTION, SOLUTION INTRAVENOUS CONTINUOUS PRN
Status: DISCONTINUED | OUTPATIENT
Start: 2023-07-21 | End: 2023-07-21

## 2023-07-21 RX ORDER — IBUPROFEN 200 MG
800 TABLET ORAL ONCE
Status: DISCONTINUED | OUTPATIENT
Start: 2023-07-22 | End: 2023-07-21

## 2023-07-21 RX ORDER — IBUPROFEN 200 MG
800 TABLET ORAL ONCE
Status: COMPLETED | OUTPATIENT
Start: 2023-07-22 | End: 2023-07-21

## 2023-07-21 RX ORDER — ACETAMINOPHEN 325 MG/1
975 TABLET ORAL ONCE
Status: COMPLETED | OUTPATIENT
Start: 2023-07-22 | End: 2023-07-21

## 2023-07-21 RX ORDER — SODIUM CHLORIDE, SODIUM LACTATE, POTASSIUM CHLORIDE, CALCIUM CHLORIDE 600; 310; 30; 20 MG/100ML; MG/100ML; MG/100ML; MG/100ML
INJECTION, SOLUTION INTRAVENOUS CONTINUOUS
Status: DISCONTINUED | OUTPATIENT
Start: 2023-07-21 | End: 2023-07-21 | Stop reason: HOSPADM

## 2023-07-21 RX ADMIN — SODIUM CHLORIDE, POTASSIUM CHLORIDE, SODIUM LACTATE AND CALCIUM CHLORIDE: 600; 310; 30; 20 INJECTION, SOLUTION INTRAVENOUS at 22:11

## 2023-07-21 RX ADMIN — ROCURONIUM BROMIDE 50 MG: 50 INJECTION, SOLUTION INTRAVENOUS at 22:18

## 2023-07-21 RX ADMIN — ACETAMINOPHEN 975 MG: 325 TABLET ORAL at 23:48

## 2023-07-21 RX ADMIN — SODIUM CHLORIDE, POTASSIUM CHLORIDE, SODIUM LACTATE AND CALCIUM CHLORIDE 100 ML/HR: 600; 310; 30; 20 INJECTION, SOLUTION INTRAVENOUS at 22:12

## 2023-07-21 RX ADMIN — HYDROMORPHONE HYDROCHLORIDE 0.5 MG: 1 INJECTION, SOLUTION INTRAMUSCULAR; INTRAVENOUS; SUBCUTANEOUS at 21:19

## 2023-07-21 RX ADMIN — FENTANYL CITRATE 25 MCG: 50 INJECTION, SOLUTION INTRAMUSCULAR; INTRAVENOUS at 23:25

## 2023-07-21 RX ADMIN — SUGAMMADEX 150 MG: 100 INJECTION, SOLUTION INTRAVENOUS at 22:51

## 2023-07-21 RX ADMIN — FENTANYL CITRATE 100 MCG: 50 INJECTION, SOLUTION INTRAMUSCULAR; INTRAVENOUS at 22:18

## 2023-07-21 RX ADMIN — LIDOCAINE HYDROCHLORIDE 20 MG: 10 INJECTION, SOLUTION INFILTRATION; PERINEURAL at 22:18

## 2023-07-21 RX ADMIN — PROPOFOL 200 MCG/KG/MIN: 10 INJECTION, EMULSION INTRAVENOUS at 22:20

## 2023-07-21 RX ADMIN — ONDANSETRON 4 MG: 2 INJECTION INTRAMUSCULAR; INTRAVENOUS at 22:45

## 2023-07-21 RX ADMIN — PROPOFOL 140 MG: 10 INJECTION, EMULSION INTRAVENOUS at 22:18

## 2023-07-21 RX ADMIN — DEXAMETHASONE SODIUM PHOSPHATE 4 MG: 4 INJECTION, SOLUTION INTRA-ARTICULAR; INTRALESIONAL; INTRAMUSCULAR; INTRAVENOUS; SOFT TISSUE at 22:23

## 2023-07-21 RX ADMIN — IBUPROFEN 800 MG: 200 TABLET, FILM COATED ORAL at 23:48

## 2023-07-21 ASSESSMENT — ACTIVITIES OF DAILY LIVING (ADL)
ADLS_ACUITY_SCORE: 35
ADLS_ACUITY_SCORE: 35

## 2023-07-21 ASSESSMENT — ENCOUNTER SYMPTOMS
SEIZURES: 0
DYSRHYTHMIAS: 0
NAUSEA: 1
VOMITING: 1
ABDOMINAL PAIN: 1

## 2023-07-21 ASSESSMENT — LIFESTYLE VARIABLES: TOBACCO_USE: 0

## 2023-07-21 ASSESSMENT — COPD QUESTIONNAIRES: COPD: 0

## 2023-07-21 NOTE — ED TRIAGE NOTES
Patient here for evaluation of abdominal discomfort and vaginal bleeding. States that she tested positive for pregnancy last month and her last menstrual cycle was 5/29. States that this is her 6th pregnancy. She states that abdominal pain and vaginal bleeding started as week ago.      Triage Assessment     Row Name 07/21/23 1545       Triage Assessment (Adult)    Airway WDL WDL       Respiratory WDL    Respiratory WDL WDL       Skin Circulation/Temperature WDL    Skin Circulation/Temperature WDL WDL       Cardiac WDL    Cardiac WDL WDL       Peripheral/Neurovascular WDL    Peripheral Neurovascular WDL WDL       Cognitive/Neuro/Behavioral WDL    Cognitive/Neuro/Behavioral WDL WDL

## 2023-07-22 VITALS
HEART RATE: 82 BPM | RESPIRATION RATE: 16 BRPM | WEIGHT: 109 LBS | HEIGHT: 57 IN | TEMPERATURE: 97.7 F | DIASTOLIC BLOOD PRESSURE: 57 MMHG | BODY MASS INDEX: 23.51 KG/M2 | SYSTOLIC BLOOD PRESSURE: 98 MMHG | OXYGEN SATURATION: 98 %

## 2023-07-22 PROCEDURE — 250N000013 HC RX MED GY IP 250 OP 250 PS 637: Performed by: ANESTHESIOLOGY

## 2023-07-22 RX ADMIN — OXYCODONE HYDROCHLORIDE 5 MG: 5 TABLET ORAL at 00:07

## 2023-07-22 NOTE — H&P
"OBGYN Consult    I was asked by Dr. Potter to see Shayna Jerry for left ectopic pregnancy.    Shayna Jerry is a 27 year old  LMP  who presents with LLQ pain radiating to diffuse abdomen and down her left leg. Started today. Last ate at 11 am, vomited. Last drank at 3 pm. Has a history of colo-colo fistula suspicious for Crohn's but was never worked up for this further. Denies other symptoms.    Past Medical History:   Diagnosis Date     Anemia       History reviewed. No pertinent surgical history.    ALLERGIES:  Patient has no known allergies.    Medications Prior to Admission   Medication Sig Dispense Refill Last Dose     acetaminophen (TYLENOL) 325 MG tablet [ACETAMINOPHEN (TYLENOL) 325 MG TABLET] Take 1-2 tablets (325-650 mg total) by mouth every 4 (four) hours as needed.  0      docusate sodium (COLACE) 100 MG capsule [DOCUSATE SODIUM (COLACE) 100 MG CAPSULE] Take 1 capsule (100 mg total) by mouth daily.  0      ferrous sulfate 325 (65 FE) MG tablet [FERROUS SULFATE 325 (65 FE) MG TABLET] Take 1 tablet by mouth daily.  3      prenatal no115-iron-folic acid 29 mg iron- 1 mg Chew [PRENATAL -IRON-FOLIC ACID 29 MG IRON- 1 MG CHEW] Chew 2 tablets daily.         O:  /65   Pulse 82   Temp 98.9  F (37.2  C) (Temporal)   Resp 16   Ht 1.448 m (4' 9\")   Wt 49.4 kg (109 lb)   LMP 2023 (Exact Date)   SpO2 100%   BMI 23.59 kg/m     Gen: alert, NAD  Abd: Soft, minimally tender after dilaudid    Component      Latest Ref Rng 2023  6:07 PM   WBC      4.0 - 11.0 10e3/uL 10.3    RBC Count      3.80 - 5.20 10e6/uL 5.71 (H)    Hemoglobin      11.7 - 15.7 g/dL 11.9    Hematocrit      35.0 - 47.0 % 39.9    MCV      78 - 100 fL 70 (L)    MCH      26.5 - 33.0 pg 20.8 (L)    MCHC      31.5 - 36.5 g/dL 29.8 (L)    RDW      10.0 - 15.0 % 15.3 (H)    Platelet Count      150 - 450 10e3/uL 264    % Neutrophils      % 87    % Lymphocytes      % 11    % Monocytes      % 2    % Eosinophils      % 0    % " Basophils      % 0    % Immature Granulocytes      % 0    NRBCs per 100 WBC      <1 /100 0    Absolute Neutrophils      1.6 - 8.3 10e3/uL 8.9 (H)    Absolute Lymphocytes      0.8 - 5.3 10e3/uL 1.1    Absolute Monocytes      0.0 - 1.3 10e3/uL 0.2    Absolute Eosinophils      0.0 - 0.7 10e3/uL 0.0    Absolute Basophils      0.0 - 0.2 10e3/uL 0.0    Absolute Immature Granulocytes      <=0.4 10e3/uL 0.0    Absolute NRBCs      10e3/uL 0.0    Sodium      136 - 145 mmol/L 136    Potassium      3.4 - 5.3 mmol/L 4.1    Chloride      98 - 107 mmol/L 101    Carbon Dioxide (CO2)      22 - 29 mmol/L 22    Anion Gap      7 - 15 mmol/L 13    Urea Nitrogen      6.0 - 20.0 mg/dL 11.0    Creatinine      0.51 - 0.95 mg/dL 0.48 (L)    Calcium      8.6 - 10.0 mg/dL 9.2    Glucose      70 - 99 mg/dL 106 (H)    GFR Estimate      >60 mL/min/1.73m2 >90    ABO/Rh(D) O POS    Antibody Screen      Negative  Negative    SPECIMEN EXPIRATION DATE [id number]    hCG Quantitative      <5 mIU/mL 513 (H)       Legend:  (H) High  (L) Low        FINDINGS:  UTERUS: Uterus is normal in size. Diffuse, uniform thickening of the endometrium measuring up to 14 mm in thickness. There is no intrauterine gestational sac. No abnormal endometrial vascularity.     RIGHT OVARY: The right ovary is normal in size measuring 3.3 x 1.7 x 2.5 cm. Within the right ovary there is a slightly complex cyst with central and peripheral hypoechoic components within internal hyperechoic rim measuring 1.5 x 1.0 x 1.4 cm.      LEFT OVARY: Left ovary measures 4.1 x 2.2 x 2.8 cm. There is 2.1 x 1.7 x 1.7 cm tubular structure adjacent to the right ovary which contains heterogeneous material a     Free fluid is present in the posterior cul-de-sac with some debris.                                                                      IMPRESSION:      1.  No intrauterine pregnancy.  2.  Complex tubular structure in the left adnexa, potential ectopic pregnancy with associated  hemorrhage.  3.  Small amount of complex fluid in the posterior cul-de-sac, suspicious for hemorrhage.         A/P:  Shayna Jerry is a 27 year old  presenting with suspected left ruptured ectopic. Recommended left salpingectomy. Risks/benefits discussed and recovery. Consent obtained with  for procedure and blood transfusion if needed. Patient can discharge to home after the procedure.    Lynda Toro MD, FACOG, [facility]  2023 10:18 PM

## 2023-07-22 NOTE — ANESTHESIA PROCEDURE NOTES
Airway       Patient location during procedure: OR       Procedure Start/Stop Times: 7/21/2023 10:19 PM  Staff -        CRNA: Tiffanie Arzola APRN CRNA       Performed By: CRNAIndications and Patient Condition       Indications for airway management: keshia-procedural       Induction type:RSI       Mask difficulty assessment: 0 - not attempted    Final Airway Details       Final airway type: endotracheal airway       Successful airway: ETT - single  Endotracheal Airway Details        ETT size (mm): 7.0       Cuffed: yes       Cuff volume (mL): 6       Successful intubation technique: direct laryngoscopy       DL Blade Type: Love 2       Grade View of Cords: 1       Adjucts: stylet       Position: Right       Measured from: gums/teeth       Secured at (cm): 20       Bite block used: Soft    Post intubation assessment        Placement verified by: capnometry, equal breath sounds and chest rise        Number of attempts at approach: 1       Number of other approaches attempted: 0       Secured with: silk tape       Ease of procedure: easy       Dentition: Intact and Unchanged       Dental guard used and removed. Dental Guard Type: Proguard Red.    Medication(s) Administered   Medication Administration Time: 7/21/2023 10:19 PM

## 2023-07-22 NOTE — ANESTHESIA CARE TRANSFER NOTE
Patient: Shayna Jerry    Procedure: Procedure(s):  LAPAROSCOPIC SALPINGECTOMY LEFT       Diagnosis: Bleeding [R58]  Diagnosis Additional Information: No value filed.    Anesthesia Type:   General     Note:    Oropharynx: oropharynx clear of all foreign objects  Level of Consciousness: awake  Oxygen Supplementation: face mask  Level of Supplemental Oxygen (L/min / FiO2): 6  Independent Airway: airway patency satisfactory and stable  Dentition: dentition unchanged  Vital Signs Stable: post-procedure vital signs reviewed and stable  Report to RN Given: handoff report given  Patient transferred to: PACU    Handoff Report: Identifed the Patient, Identified the Reponsible Provider, Reviewed the pertinent medical history, Discussed the surgical course, Reviewed Intra-OP anesthesia mangement and issues during anesthesia, Set expectations for post-procedure period and Allowed opportunity for questions and acknowledgement of understanding      Vitals:  Vitals Value Taken Time   /88 07/21/23 2303   Temp 36.9  C (98.4  F) 07/21/23 2303   Pulse 95 07/21/23 2307   Resp 18 07/21/23 2307   SpO2 100 % 07/21/23 2307   Vitals shown include unvalidated device data.    Electronically Signed By: ZEFERINO Riley CRNA  July 21, 2023  11:09 PM

## 2023-07-22 NOTE — ANESTHESIA PREPROCEDURE EVALUATION
Anesthesia Pre-Procedure Evaluation    Patient: Shayna Jerry   MRN: [id number] : 1995        Procedure : Procedure(s):  LAPAROSCOPIC SALPINGECTOMY LEFT          Past Medical History:   Diagnosis Date     Anemia       History reviewed. No pertinent surgical history.   No Known Allergies   Social History     Tobacco Use     Smoking status: Never     Smokeless tobacco: Never   Substance Use Topics     Alcohol use: Not Currently      Wt Readings from Last 1 Encounters:   23 49.4 kg (109 lb)        Anesthesia Evaluation   Pt has not had prior anesthetic     No history of anesthetic complications       ROS/MED HX  ENT/Pulmonary:    (-) tobacco use, asthma, COPD, sleep apnea, MARY risk factors and recent URI   Neurologic:    (-) no seizures and no CVA   Cardiovascular:    (-) hypertension, taking anticoagulants/antiplatelets, syncope and arrhythmias   METS/Exercise Tolerance: >4 METS    Hematologic:    (-) anemia   Musculoskeletal:       GI/Hepatic:    (-) GERD   Renal/Genitourinary:    (-) renal disease   Endo:    (-) Type I DM, Type II DM, thyroid disease and obesity   Psychiatric/Substance Use:    (-) chronic opioid use history   Infectious Disease:    (-) Recent Fever   Malignancy:       Other:      (+) Possibly pregnant (ectopic ), ,         Physical Exam    Airway        Mallampati: II   TM distance: > 3 FB   Neck ROM: full   Mouth opening: > 3 cm    Respiratory Devices and Support         Dental     Comment: Fair dentition, no loose or removable teeth        Cardiovascular          Rhythm and rate: regular and normal     Pulmonary   pulmonary exam normal                OUTSIDE LABS:  CBC:   Lab Results   Component Value Date    WBC 10.3 2023    WBC 7.1 2020    HGB 11.9 2023    HGB 9.4 (L) 2020    HCT 39.9 2023    HCT 38.4 2020     2023     2020     BMP:   Lab Results   Component Value Date     2023     2019    POTASSIUM  4.1 07/21/2023    POTASSIUM 4.0 04/22/2019    CHLORIDE 101 07/21/2023    CHLORIDE 105 04/22/2019    CO2 22 07/21/2023    CO2 21 (L) 04/22/2019    BUN 11.0 07/21/2023    BUN 7 (L) 04/22/2019    CR 0.48 (L) 07/21/2023    CR 0.56 (L) 04/22/2019     (H) 07/21/2023    GLC 83 04/22/2019     COAGS: No results found for: PTT, INR, FIBR  POC:   Lab Results   Component Value Date    HCG Negative 12/14/2019     HEPATIC:   Lab Results   Component Value Date    ALBUMIN 3.6 04/22/2019    PROTTOTAL 7.4 04/22/2019    ALT 9 04/22/2019    AST 16 04/22/2019    ALKPHOS 89 04/22/2019    BILITOTAL 0.3 04/22/2019     OTHER:   Lab Results   Component Value Date    AVINASH 9.2 07/21/2023       Anesthesia Plan    ASA Status:  1, emergent    NPO Status:  NPO Appropriate    Anesthesia Type: General.     - Airway: ETT   Induction: RSI.   Maintenance: TIVA.        Consents    Anesthesia Plan(s) and associated risks, benefits, and realistic alternatives discussed. Questions answered and patient/representative(s) expressed understanding.    - Discussed:     - Discussed with:  Patient, Spouse      - Extended Intubation/Ventilatory Support Discussed: No.      - Patient is DNR/DNI Status: No    Use of blood products discussed: Yes.     - Discussed with: Patient.     - Consented: consented to blood products            Reason for refusal: other.     Postoperative Care    Pain management: IV analgesics, Multi-modal analgesia.   PONV prophylaxis: Ondansetron (or other 5HT-3), Dexamethasone or Solumedrol     Comments:    Other Comments:   GETA, RSI  TIVA  Acetaminophen 1 g IV, ketorolac 15 mg IV if ok w/ surgeon  Dexamethasone, ondansetron PONV ppx             Ekta Orantes MD

## 2023-07-22 NOTE — ANESTHESIA POSTPROCEDURE EVALUATION
Patient: Shayna Jerry    Procedure: Procedure(s):  LAPAROSCOPIC SALPINGECTOMY LEFT       Anesthesia Type:  General    Note:  Disposition: Outpatient   Postop Pain Control: Uneventful            Sign Out: Well controlled pain   PONV: No   Neuro/Psych: Uneventful            Sign Out: Acceptable/Baseline neuro status   Airway/Respiratory: Uneventful            Sign Out: Acceptable/Baseline resp. status   CV/Hemodynamics: Uneventful            Sign Out: Acceptable CV status; No obvious hypovolemia; No obvious fluid overload   Other NRE: NONE   DID A NON-ROUTINE EVENT OCCUR? No           Last vitals:  Vitals Value Taken Time   /88 07/21/23 2303   Temp 36.9  C (98.4  F) 07/21/23 2303   Pulse 92 07/21/23 2312   Resp 16 07/21/23 2312   SpO2 100 % 07/21/23 2312   Vitals shown include unvalidated device data.    Electronically Signed By: Ekta Orantes MD  July 21, 2023  11:14 PM

## 2023-07-22 NOTE — ED PROVIDER NOTES
EMERGENCY DEPARTMENT ENCOUNTER      NAME: Shayna Jerry  AGE: 27 year old female  YOB: 1995  MRN: [id number]  EVALUATION DATE & TIME: 2023  8:12 PM    PCP: Delmy Norris    ED PROVIDER: Cash Barton MD        Chief Complaint   Patient presents with     Abdominal Pain     Vaginal Bleeding         FINAL IMPRESSION:  1. Left tubal pregnancy without intrauterine pregnancy    2. Left tubal pregnancy, unspecified whether intrauterine pregnancy present          ED COURSE & MEDICAL DECISION MAKIN:52 PM I introduced myself to the patient, obtained patient history, performed a physical exam, and discussed plan for ED workup including potential diagnostic laboratory/imaging studies and interventions.  9:12 PM I spoke with VONNIE Pat, who is coming to the ED to evaluate the patient     Pertinent Labs & Imaging studies reviewed. (See chart for details)  27 year old female presents to the Emergency Department for evaluation of vaginal bleeding, positive pregnancy test, left lower quadrant pain    Differential includes ectopic pregnancy, diverticulitis, PID, miscarriage, ovarian cyst, tubo-ovarian abscess, kidney stone    Plan for type and screen, ECG, CBC, BMP, IV Dilaudid for pain, will be ultrasound    Radiology called me stating there is concern for free fluid in the abdomen could be blood as well as a mass in the left fallopian tube    Patient hemodynamically stable.  Rh+.  Spoke with OB/GYN who plans take patient to the OR.    Patient transferred the OR in stable condition    ED Course as of 23 I spoke with radiology, left adnexa, ? Free fluid in pelvis which could be blood.    0 Hemoglobin: 11.9   2321 Platelet Count: 264   2321 WBC: 10.3   2321 hCG Quantitative(!): 513   2322 ABO/Rh(D): O POS   232 Creatinine(!): 0.48         Medical Decision Making    History:    Supplemental history from: Documented in chart, if applicable    External Record(s)  reviewed: Documented in chart, if applicable.    Work Up:    Chart documentation includes differential considered and any EKGs or imaging independently interpreted by provider, where specified.    In additional to work up documented, I considered the following work up: Documented in chart, if applicable.    External consultation:    Discussion of management with another provider: Documented in chart, if applicable and OB-Gyn    Complicating factors:    Care impacted by chronic illness: N/A    Care affected by social determinants of health: N/A    Disposition considerations: Sent to OR with OB          Voice recognition software was used in the creation of this note. Any grammatical or nonsensical errors are due to inherent errors with the software and are not the intention of the writer.         At the conclusion of the encounter I discussed the results of all of the tests and the disposition. The questions were answered. The patient or family acknowledged understanding and was agreeable with the care plan.         The patient is critically ill and has required 30 minutes of critical care time exclusive of procedures. This includes time spent interviewing the patient, ordering tests and medications, monitoring vital signs, reviewing results, patient updates, discussing the case with family and consultants, and admission.      MEDICATIONS GIVEN IN THE EMERGENCY:  Medications   lactated ringers infusion (100 mL/hr Intravenous $New Bag 7/21/23 8415)   fentaNYL (PF) (SUBLIMAZE) injection 25 mcg (has no administration in time range)   fentaNYL (PF) (SUBLIMAZE) injection 50 mcg (has no administration in time range)   HYDROmorphone (PF) (DILAUDID) injection 0.2 mg (has no administration in time range)   HYDROmorphone (PF) (DILAUDID) injection 0.4 mg (has no administration in time range)   ondansetron (ZOFRAN ODT) ODT tab 4 mg (has no administration in time range)     Or   ondansetron (ZOFRAN) injection 4 mg (has no  administration in time range)   prochlorperazine (COMPAZINE) injection 5 mg (has no administration in time range)   oxyCODONE (ROXICODONE) tablet 5 mg (has no administration in time range)   oxyCODONE (ROXICODONE) tablet 10 mg (has no administration in time range)   ondansetron (ZOFRAN ODT) ODT tab 4 mg (has no administration in time range)     Or   ondansetron (ZOFRAN) injection 4 mg (has no administration in time range)   prochlorperazine (COMPAZINE) injection 5 mg (has no administration in time range)   naloxone (NARCAN) injection 0.2 mg (has no administration in time range)     Or   naloxone (NARCAN) injection 0.4 mg (has no administration in time range)     Or   naloxone (NARCAN) injection 0.2 mg (has no administration in time range)     Or   naloxone (NARCAN) injection 0.4 mg (has no administration in time range)   acetaminophen (TYLENOL) tablet 975 mg (has no administration in time range)   ibuprofen (ADVIL/MOTRIN) tablet 800 mg (has no administration in time range)   oxyCODONE (ROXICODONE) tablet 5 mg (has no administration in time range)   HYDROmorphone (PF) (DILAUDID) injection 0.5 mg (0.5 mg Intravenous $Given 7/21/23 2119)       NEW PRESCRIPTIONS STARTED AT TODAY'S ER VISIT  Current Discharge Medication List      START taking these medications    Details   !! acetaminophen (TYLENOL) 325 MG tablet Take 3 tablets (975 mg) by mouth every 6 hours as needed for mild pain  Qty: 50 tablet, Refills: 0    Associated Diagnoses: Left tubal pregnancy without intrauterine pregnancy      ibuprofen (ADVIL/MOTRIN) 800 MG tablet Take 1 tablet (800 mg) by mouth every 6 hours as needed for other (mild and/or inflammatory pain)  Qty: 30 tablet, Refills: 0    Associated Diagnoses: Left tubal pregnancy without intrauterine pregnancy      oxyCODONE (ROXICODONE) 5 MG tablet Take 1-2 tablets (5-10 mg) by mouth every 4 hours as needed for moderate to severe pain  Qty: 12 tablet, Refills: 0    Associated Diagnoses: Left tubal  "pregnancy without intrauterine pregnancy      senna-docusate (SENOKOT-S/PERICOLACE) 8.6-50 MG tablet Take 1-2 tablets by mouth 2 times daily  Qty: 30 tablet, Refills: 0    Associated Diagnoses: Left tubal pregnancy without intrauterine pregnancy       !! - Potential duplicate medications found. Please discuss with provider.             =================================================================    HPI    Triage note  \"  Patient here for evaluation of abdominal discomfort and vaginal bleeding. States that she tested positive for pregnancy last month and her last menstrual cycle was . States that this is her 6th pregnancy. She states that abdominal pain and vaginal bleeding started as week ago.      Triage Assessment     Row Name 23 1542       Triage Assessment (Adult)    Airway WDL WDL       Respiratory WDL    Respiratory WDL WDL       Skin Circulation/Temperature WDL    Skin Circulation/Temperature WDL WDL       Cardiac WDL    Cardiac WDL WDL       Peripheral/Neurovascular WDL    Peripheral Neurovascular WDL WDL       Cognitive/Neuro/Behavioral WDL    Cognitive/Neuro/Behavioral WDL WDL              \"      Patient information was obtained from: Patient     Use of :  Yes ( Phone ) - Language Cheryl Jerry is a 27 year old female with a pertinent history of anemia who presents to this ED by walk in for evaluation of abdominal pain and vaginal bleeding.    Patient is  and is 1 month pregnant. Patient has been having abdominal pain and vaginal bleeding since (4 days ago). She says her vaginal bleeding is not as much as her normal period. Patient ate food at 11:00am but vomited the food out and she drank water at 3:00pm.     REVIEW OF SYSTEMS   Review of Systems   Gastrointestinal: Positive for abdominal pain, nausea and vomiting.   Genitourinary: Positive for vaginal bleeding.        PAST MEDICAL HISTORY:  Past Medical History:   Diagnosis Date     Anemia        PAST SURGICAL " "HISTORY:  History reviewed. No pertinent surgical history.        CURRENT MEDICATIONS:    acetaminophen (TYLENOL) 325 MG tablet  ibuprofen (ADVIL/MOTRIN) 800 MG tablet  oxyCODONE (ROXICODONE) 5 MG tablet  senna-docusate (SENOKOT-S/PERICOLACE) 8.6-50 MG tablet        ALLERGIES:  No Known Allergies    FAMILY HISTORY:  History reviewed. No pertinent family history.    SOCIAL HISTORY:   Social History     Socioeconomic History     Marital status: Single   Tobacco Use     Smoking status: Never     Smokeless tobacco: Never   Substance and Sexual Activity     Alcohol use: Not Currently     Drug use: Never     Sexual activity: Never       VITALS:  /88   Pulse 104   Temp 98.4  F (36.9  C) (Temporal)   Resp 29   Ht 1.448 m (4' 9\")   Wt 49.4 kg (109 lb)   LMP 05/29/2023 (Exact Date)   SpO2 100%   BMI 23.59 kg/m      PHYSICAL EXAM      Vitals: /88   Pulse 104   Temp 98.4  F (36.9  C) (Temporal)   Resp 29   Ht 1.448 m (4' 9\")   Wt 49.4 kg (109 lb)   LMP 05/29/2023 (Exact Date)   SpO2 100%   BMI 23.59 kg/m    General: Appears in no acute distress, awake, alert, interactive.  Eyes: Conjunctivae non-injected. Sclera anicteric.  HENT: Atraumatic.  Neck: Supple.  Respiratory/Chest: Respiration unlabored.  Heart:   Abdomen: non distended  Musculoskeletal: Normal extremities. No edema or erythema.  Skin: Normal color. No rash or diaphoresis.  Neurologic: Face symmetric, moves all extremities spontaneously. Speech clear.  Psychiatric: Oriented to person, place, and time. Affect appropriate.       LAB:  All pertinent labs reviewed and interpreted.  Results for orders placed or performed during the hospital encounter of 07/21/23   OB  US 1st trimester w transvag   Result Value Ref Range    Radiologist flags Suspected for ectopic pregnancy left adnexa (AA)     Impression    IMPRESSION:     1.  No intrauterine pregnancy.  2.  Complex tubular structure in the left adnexa, potential ectopic pregnancy with " associated hemorrhage.  3.  Small amount of complex fluid in the posterior cul-de-sac, suspicious for hemorrhage.        [Critical Result: Suspected for ectopic pregnancy left adnexa]    Finding was identified on 7/21/2023 8:21 PM CDT.     Dr. Barton was contacted by me on 7/21/2023 8:41 PM CDT and verbalized understanding of the critical result.   HCG quantitative pregnancy (blood)   Result Value Ref Range    hCG Quantitative 513 (H) <5 mIU/mL   Basic metabolic panel   Result Value Ref Range    Sodium 136 136 - 145 mmol/L    Potassium 4.1 3.4 - 5.3 mmol/L    Chloride 101 98 - 107 mmol/L    Carbon Dioxide (CO2) 22 22 - 29 mmol/L    Anion Gap 13 7 - 15 mmol/L    Urea Nitrogen 11.0 6.0 - 20.0 mg/dL    Creatinine 0.48 (L) 0.51 - 0.95 mg/dL    Calcium 9.2 8.6 - 10.0 mg/dL    Glucose 106 (H) 70 - 99 mg/dL    GFR Estimate >90 >60 mL/min/1.73m2   CBC with platelets and differential   Result Value Ref Range    WBC Count 10.3 4.0 - 11.0 10e3/uL    RBC Count 5.71 (H) 3.80 - 5.20 10e6/uL    Hemoglobin 11.9 11.7 - 15.7 g/dL    Hematocrit 39.9 35.0 - 47.0 %    MCV 70 (L) 78 - 100 fL    MCH 20.8 (L) 26.5 - 33.0 pg    MCHC 29.8 (L) 31.5 - 36.5 g/dL    RDW 15.3 (H) 10.0 - 15.0 %    Platelet Count 264 150 - 450 10e3/uL    % Neutrophils 87 %    % Lymphocytes 11 %    % Monocytes 2 %    % Eosinophils 0 %    % Basophils 0 %    % Immature Granulocytes 0 %    NRBCs per 100 WBC 0 <1 /100    Absolute Neutrophils 8.9 (H) 1.6 - 8.3 10e3/uL    Absolute Lymphocytes 1.1 0.8 - 5.3 10e3/uL    Absolute Monocytes 0.2 0.0 - 1.3 10e3/uL    Absolute Eosinophils 0.0 0.0 - 0.7 10e3/uL    Absolute Basophils 0.0 0.0 - 0.2 10e3/uL    Absolute Immature Granulocytes 0.0 <=0.4 10e3/uL    Absolute NRBCs 0.0 10e3/uL   Adult Type and Screen   Result Value Ref Range    ABO/RH(D) O POS     Antibody Screen Negative Negative    SPECIMEN EXPIRATION DATE [id number]        RADIOLOGY:  Reviewed all pertinent imaging. Please see official radiology report.  OB  US 1st  trimester w transvag   Final Result   Abnormal   IMPRESSION:       1.  No intrauterine pregnancy.   2.  Complex tubular structure in the left adnexa, potential ectopic pregnancy with associated hemorrhage.   3.  Small amount of complex fluid in the posterior cul-de-sac, suspicious for hemorrhage.           [Critical Result: Suspected for ectopic pregnancy left adnexa]      Finding was identified on 7/21/2023 8:21 PM CDT.       Dr. Barton was contacted by me on 7/21/2023 8:41 PM CDT and verbalized understanding of the critical result.            I, Fabian Adrian, am serving as a scribe to document services personally performed by Saul Barton MD based on my observation and the provider's statements to me. I, Dr. Saul Barton, attest that Fabian Adrian is acting in a scribe capacity, has observed my performance of the services and has documented them in accordance with my direction.    Saul Barton MD  Emergency Medicine  [facility] EMERGENCY DEPARTMENT  [address] [place] [id number]  [phone]       Saul Barton MD  07/21/23 2324

## 2023-07-22 NOTE — OP NOTE
Operative note    Preoperative diagnosis: Left ectopic pregnancy  Postoperative diagnosis: Same     Procedure: Laparoscopic left salpingectomy    Surgeon: Lynda Toro MD     Anesthesia: General  Complications: None  EBL: 5 mL  Urine output: 700 mL    Findings: Normal appearing uterus, ovaries. Enlarged fallopian tube on the left. Normal right tube. No adhesions.    Indications: Patient is a 26 y/o  who presented to the ER with LLQ pain and found to have a suspected ruptured left ectopic pregnancy.     Procedure:  The patient was taken to the operating room where general endotracheal anesthesia was administered without difficulty.  She was then placed in the dorsal lithotomy position. She was prepped and draped in the usual sterile fashion.  A michael catheter was placed.  A bivalve speculum was placed in the patient's vagina and the anterior lip of the cervix was grasped with the single toothed tenaculum.  A sound for a uterine manipulator was then advanced into the uterus and the single toothed tenaculum was removed.  The speculum was removed from the vagina.    Attention was then turned to the patient's abdomen where a 5 mm vertical skin incision was made in the umbilical fold after infiltration with 0.25% marcaine.  The Veress needle was carefully introduced into the peritoneal cavity at a 90 degree angle while tenting the abdominal wall.  Intraperitoneal placement was confirmed by freely flowing water through the Veress needle and an intraabdominal pressure of 5 mm on insufflation with CO2 gas.  The 5 mm trocar and sleeve were then advanced without difficulty into the abdomen where intraabdominal placement was confirmed by the laparoscope.  No intraabdominal injury was noted from the placement of the first trocar.  After infiltration with 0.25% marcaine, a left lower quadrant 5 mm skin incision was made and trocar and sleeve advanced under direct visualization.  After infiltration with 0.25% marcaine,  a 5 mm left lower quadrant skin incision was made and trocar and sleeve advanced under direct visualization.    A survey of the patient's abdomen revealed a normal liver surface.  The patient was then placed in steep Trendelenberg position and a survey of the patient's pelvis revealed normal uterus.     The Ligasure was then advanced through a port and the patient's left fallopian tube was identified and followed out to the fimbriated end. The fallopian tube was sequentially coagulated and cut from the mesosalpinx to the level of the cornua where the fallopian tube was transected.  The left fallopian tube was removed through the 5 mm port and sent to pathology.  There was no bleeding in the mesosalpinx.    All instruments and ports were then removed from the patient's abdomen.  Incisions were repaired with 4-0 Monocryl and dermabond.  The uterine manipulator was removed from the vagina with no bleeding noted from the cervix at the tenaculum site.  The patient tolerated the procedure well.  Sponge, lap, and needle counts were correct.  The patient was taken to the recovery room in stable condition.    Lynda Toro MD

## 2023-07-25 LAB
PATH REPORT.COMMENTS IMP SPEC: NORMAL
PATH REPORT.COMMENTS IMP SPEC: NORMAL
PATH REPORT.FINAL DX SPEC: NORMAL
PATH REPORT.GROSS SPEC: NORMAL
PATH REPORT.MICROSCOPIC SPEC OTHER STN: NORMAL
PATH REPORT.RELEVANT HX SPEC: NORMAL
PHOTO IMAGE: NORMAL

## 2024-02-07 ENCOUNTER — OFFICE VISIT (OUTPATIENT)
Dept: FAMILY MEDICINE | Facility: CLINIC | Age: 29
End: 2024-02-07
Payer: COMMERCIAL

## 2024-02-07 VITALS
HEIGHT: 56 IN | SYSTOLIC BLOOD PRESSURE: 95 MMHG | BODY MASS INDEX: 22.72 KG/M2 | OXYGEN SATURATION: 99 % | DIASTOLIC BLOOD PRESSURE: 61 MMHG | RESPIRATION RATE: 20 BRPM | TEMPERATURE: 98.4 F | WEIGHT: 101 LBS | HEART RATE: 90 BPM

## 2024-02-07 DIAGNOSIS — Z32.01 PREGNANCY TEST POSITIVE: ICD-10-CM

## 2024-02-07 DIAGNOSIS — Z76.89 ENCOUNTER TO ESTABLISH CARE: Primary | ICD-10-CM

## 2024-02-07 DIAGNOSIS — N91.2 AMENORRHEA: ICD-10-CM

## 2024-02-07 DIAGNOSIS — Z23 NEED FOR PROPHYLACTIC VACCINATION AND INOCULATION AGAINST INFLUENZA: ICD-10-CM

## 2024-02-07 LAB — HCG UR QL: POSITIVE

## 2024-02-07 PROCEDURE — 90471 IMMUNIZATION ADMIN: CPT

## 2024-02-07 PROCEDURE — 90686 IIV4 VACC NO PRSV 0.5 ML IM: CPT

## 2024-02-07 PROCEDURE — 81025 URINE PREGNANCY TEST: CPT

## 2024-02-07 PROCEDURE — 99213 OFFICE O/P EST LOW 20 MIN: CPT | Mod: 25

## 2024-02-07 RX ORDER — PRENATAL VIT/IRON FUM/FOLIC AC 27MG-0.8MG
1 TABLET ORAL DAILY
Qty: 90 TABLET | Refills: 3 | Status: SHIPPED | OUTPATIENT
Start: 2024-02-07 | End: 2024-03-12

## 2024-02-07 NOTE — PROGRESS NOTES
Assessment & Plan     Encounter to establish care  Patient presents to establish care. Notes that her children receive care here, so she wants to follow at this clinic. No significant medical history aside from alpha thalassemia. No current medications.     Amenorrhea  Pregnancy test positive  Patient presents with amenorrhea, pregnancy test positive today in clinic. This pregnancy was planned and desired. LMP was 9/18/23, with SHARIF on 6/24/24, putting her at 20w2d gestation today. She tolerated first trimester without issue. She has not been taking prenatal vitamin, so this was prescribed today. She notes that she had blood work and an initial ultrasound at a free clinic; had her complete an JOHAN today to obtain this information. Will have OB RN call patient to set her up with OB provider in our clinic. History of anemia in previous pregnancy.   - Prenatal Vit-Fe Fumarate-FA (PRENATAL MULTIVITAMIN W/IRON) 27-0.8 MG tablet; Take 1 tablet by mouth daily    Need for prophylactic vaccination and inoculation against influenza  Agrees to routine vaccination today.     OB RN notified, will reach out to patient for intake and to schedule first OB appointment with OB provider.     Subjective   Shayna is a 28 year old, presenting for the following health issues:  Establish Care (For OB care)      2/7/2024     9:04 AM   Additional Questions   Roomed by Rosalina   Accompanied by CHASE     Via the Health Maintenance questionnaire, the patient has reported the following services have been completed -Cervical Cancer Screening, this information has been sent to the abstraction team.    HPI     Establish care  - Previous care: recently moved back from [place]. Had been receiving care at multiple different clinics when she previously lived in [place]  - Transferring to [facility] because her kids receive care here  - Medical history: Alpha thalassemia   - Surgical history: Ectopic pregnancy with surgery in July 2023  - Medications: Not on any  "medications at this time    Confirmation of pregnancy  - LMP: 9/18/23  - SHARIF by gestation: 6/24/23, estimated to be at 20w2d today.   - Tolerated first trimester well.   - Already had an ultrasound at a free clinic - [facility]. Will have patient sign JOHAN to receive this US information.   - Pregnancy tests at home: Yes, positive  - Planned pregnancy, desired.   - Has three children at home.   - Previous pregnancies: Seventh pregnancy - couple of miscarriages, one ectopic pregnancy.   - Pregnancy complications: Anemia, considering her history of thalassemia as above. No premature deliveries.   - Pregnancy test today: Positive   - No prenatal vitamin  - Not taking any ibuprofen  - No alcohol or smoking        Objective    BP 95/61   Pulse 90   Temp 98.4  F (36.9  C)   Resp 20   Ht 1.42 m (4' 7.91\")   Wt 45.8 kg (101 lb)   LMP 09/18/2023 (Exact Date)   SpO2 99%   BMI 22.72 kg/m    Body mass index is 22.72 kg/m .  Physical Exam     GENERAL: alert and no distress  EYES: Eyes grossly normal to inspection, PERRL and conjunctivae and sclerae normal  RESP: lungs clear to auscultation - no rales, rhonchi or wheezes  CV: regular rate and rhythm, normal S1 S2, no S3 or S4, no murmur, click or rub, no peripheral edema  ABDOMEN: gestational uterus measuring at 18 cm. Positive heart tones noted. Abdomen otherwise soft and non-tender.   MS: no gross musculoskeletal defects noted, no edema  PSYCH: mentation appears normal, affect normal/bright    Results for orders placed or performed in visit on 02/07/24 (from the past 24 hour(s))   HCG qualitative urine   Result Value Ref Range    hCG Urine Qualitative Positive (A) Negative         Signed Electronically by: Guillermina Liao MD  "

## 2024-02-13 NOTE — PROGRESS NOTES
Preceptor Attestation:   Patient seen, evaluated and discussed with the resident. I have verified the content of the note, which accurately reflects my assessment of the patient and the plan of care.    Supervising Physician:Manuel Rod MD    [facility]

## 2024-02-21 ENCOUNTER — TELEPHONE (OUTPATIENT)
Dept: FAMILY MEDICINE | Facility: CLINIC | Age: 29
End: 2024-02-21
Payer: COMMERCIAL

## 2024-02-21 NOTE — TELEPHONE ENCOUNTER
Writer called, no answer, left detailed message to call me back. Would like to complete OB intake with patient and schedule NOB appointment. Kali BARAJAS

## 2024-03-05 NOTE — TELEPHONE ENCOUNTER
Writer has not received a return call from patient. Called pt again, no answer, left another message for Shayna to call clinic. Kali BARAJAS

## 2024-03-06 PROBLEM — O00.109 ECTOPIC PREGNANCY, TUBAL: Status: ACTIVE | Noted: 2023-07-01

## 2024-03-12 ENCOUNTER — OFFICE VISIT (OUTPATIENT)
Dept: FAMILY MEDICINE | Facility: CLINIC | Age: 29
End: 2024-03-12
Payer: COMMERCIAL

## 2024-03-12 VITALS
DIASTOLIC BLOOD PRESSURE: 59 MMHG | HEIGHT: 57 IN | RESPIRATION RATE: 15 BRPM | BODY MASS INDEX: 22.87 KG/M2 | SYSTOLIC BLOOD PRESSURE: 94 MMHG | WEIGHT: 106 LBS | OXYGEN SATURATION: 99 % | TEMPERATURE: 98.4 F | HEART RATE: 92 BPM

## 2024-03-12 DIAGNOSIS — Z34.82 SUPERVISION OF NORMAL INTRAUTERINE PREGNANCY IN MULTIGRAVIDA IN SECOND TRIMESTER: Primary | ICD-10-CM

## 2024-03-12 DIAGNOSIS — D56.3 ALPHA THALASSEMIA MINOR: ICD-10-CM

## 2024-03-12 DIAGNOSIS — Z11.59 NEED FOR HEPATITIS C SCREENING TEST: ICD-10-CM

## 2024-03-12 PROBLEM — Z34.90 PREGNANT: Status: RESOLVED | Noted: 2019-07-10 | Resolved: 2024-03-12

## 2024-03-12 PROBLEM — D50.9 MICROCYTIC ANEMIA: Status: RESOLVED | Noted: 2019-04-22 | Resolved: 2024-03-12

## 2024-03-12 LAB
ABO/RH(D): NORMAL
ANTIBODY SCREEN: NEGATIVE
ERYTHROCYTE [DISTWIDTH] IN BLOOD BY AUTOMATED COUNT: 15.6 % (ref 10–15)
HCT VFR BLD AUTO: 29.1 % (ref 35–47)
HGB BLD-MCNC: 8.7 G/DL (ref 11.7–15.7)
MCH RBC QN AUTO: 22.3 PG (ref 26.5–33)
MCHC RBC AUTO-ENTMCNC: 29.9 G/DL (ref 31.5–36.5)
MCV RBC AUTO: 75 FL (ref 78–100)
PLATELET # BLD AUTO: 184 10E3/UL (ref 150–450)
RBC # BLD AUTO: 3.9 10E6/UL (ref 3.8–5.2)
RUBV IGG SERPL QL IA: 2.08 INDEX
RUBV IGG SERPL QL IA: POSITIVE
SPECIMEN EXPIRATION DATE: NORMAL
SPECIMEN EXPIRATION DATE: NORMAL
T PALLIDUM AB SER QL: NONREACTIVE
WBC # BLD AUTO: 7.1 10E3/UL (ref 4–11)

## 2024-03-12 PROCEDURE — 86901 BLOOD TYPING SEROLOGIC RH(D): CPT

## 2024-03-12 PROCEDURE — 82728 ASSAY OF FERRITIN: CPT

## 2024-03-12 PROCEDURE — 99207 PR FIRST OB VISIT: CPT | Mod: GC

## 2024-03-12 PROCEDURE — 87389 HIV-1 AG W/HIV-1&-2 AB AG IA: CPT

## 2024-03-12 PROCEDURE — 87591 N.GONORRHOEAE DNA AMP PROB: CPT

## 2024-03-12 PROCEDURE — 87086 URINE CULTURE/COLONY COUNT: CPT

## 2024-03-12 PROCEDURE — 85027 COMPLETE CBC AUTOMATED: CPT

## 2024-03-12 PROCEDURE — 36415 COLL VENOUS BLD VENIPUNCTURE: CPT

## 2024-03-12 PROCEDURE — 87186 SC STD MICRODIL/AGAR DIL: CPT

## 2024-03-12 PROCEDURE — 86780 TREPONEMA PALLIDUM: CPT

## 2024-03-12 PROCEDURE — 99000 SPECIMEN HANDLING OFFICE-LAB: CPT

## 2024-03-12 PROCEDURE — 86803 HEPATITIS C AB TEST: CPT

## 2024-03-12 PROCEDURE — 87340 HEPATITIS B SURFACE AG IA: CPT

## 2024-03-12 PROCEDURE — 83655 ASSAY OF LEAD: CPT | Mod: 90

## 2024-03-12 PROCEDURE — 86850 RBC ANTIBODY SCREEN: CPT

## 2024-03-12 PROCEDURE — 86762 RUBELLA ANTIBODY: CPT

## 2024-03-12 PROCEDURE — 86900 BLOOD TYPING SEROLOGIC ABO: CPT

## 2024-03-12 PROCEDURE — 87491 CHLMYD TRACH DNA AMP PROBE: CPT

## 2024-03-12 RX ORDER — PRENATAL VIT/IRON FUM/FOLIC AC 27MG-0.8MG
1 TABLET ORAL DAILY
Qty: 90 TABLET | Refills: 3 | Status: SHIPPED | OUTPATIENT
Start: 2024-03-12

## 2024-03-12 NOTE — PROGRESS NOTES
Shayna Jerry is a 28 year old  female who presents to clinic for a new OB visit.     Her concerns today: None    Notable history so far this pregnancy: late to prenatal care    Notable history from prior pregnancies: Hx anemia, no blood pressure or diabetes problems    Pregnancy Dating:   Her last menstrual period was 23, normal, after left salpingectomy in July for ectopic pregnancy.  First ultrasound (at [facility]) showed unknown gestation age, patient thinks it corresponded with her LMP.  Estimated Date of Delivery: 2024 via LMP.    Early Pregnancy symptoms  She has not had bleeding since her LMP.   She has not had any abdominal pain or cramping since her LMP.   She has had mild nausea, now resolved.  Weigh loss has not occurred.     Pregnancy plan  This was a planned pregnancy.   It IS desired.    Pre Term Labor Risk Assessment  Is the patient's age <18 or >40? No  Patint's BMI is Body mass index is 22.74 kg/m .. Does patient have a BMI < 18.5? No  If previous pregnancy, was delivery within previous 6 months? No  Have you ever delivered a baby prior to 37 weeks gestation? No  Did conception for this pregnancy occur via In Vitro Fertilization? No  Summary: Patient is not high risk for  Labor for  labor.    Patient Active Problem List   Diagnosis    Alpha thalassemia minor    Ectopic pregnancy, tubal       OB History    Para Term  AB Living   7 3 3 0 3 3   SAB IAB Ectopic Multiple Live Births   2 0 1 0 3      # Outcome Date GA Lbr Armando/2nd Weight Sex Delivery Anes PTL Lv   7 Current            6 Ectopic 2023     ECTOPIC      5 Term 22 39w6d 06:00 / 00:03 2.83 kg (6 lb 3.8 oz) F Vag-Spont None N DAPHNE      Name: PRESLEY JERRYRL SHAYNA      Apgar1: 9  Apgar5: 9   4 SAB            3 Term 20 38w3d 05:35 / 00:05 3.17 kg (6 lb 15.8 oz) M Vag-Spont None N DAPHNE      Name: SARAH,MALE-SHAYNA      Apgar1: 9  Apgar5: 9   2 Term 19 38w4d 05:27 / 00:18 2.637  kg (5 lb 13 oz) M Vag-Spont None  DAPHNE      Name: SARAHMALE-CIELO      Apgar1: 8  Apgar5: 9   1 SAB 2019               Past Medical History:   Diagnosis Date    Alpha-thalassemia (H24)     Anemia     Ectopic pregnancy, tubal 07/2023    right fallopian       Past Surgical History:   Procedure Laterality Date    LAPAROSCOPIC SALPINGECTOMY Left 7/21/2023    Procedure: LAPAROSCOPIC SALPINGECTOMY LEFT;  Surgeon: Silvina Toro MD;  Location: [facility] Main OR       Current Outpatient Medications   Medication Sig Dispense Refill    Prenatal Vit-Fe Fumarate-FA (PRENATAL MULTIVITAMIN W/IRON) 27-0.8 MG tablet Take 1 tablet by mouth daily 90 tablet 3    acetaminophen (TYLENOL) 325 MG tablet [ACETAMINOPHEN (TYLENOL) 325 MG TABLET] Take 1-2 tablets (325-650 mg total) by mouth every 4 (four) hours as needed. (Patient not taking: Reported on 3/12/2024)  0       Do you have a history of any of the following medical conditions?  Condition Yes/No   Hypertension No   Heart disease, mitral valve prolapse, rheumatic fever No   Asthma or another chronic lung disease No   An autoimmune disorder No   Kidney disease No   Frequent urinary tract infections No   Migraine headaches No   Stroke, loss of sensation/function, seizures, or other neuro problem No   Diabetes No   Thyroid problems or have you taken thyroid medication No   Hepatitis, liver disease, jaundice No   Blood clots, phlebitis, pulmonary embolism or varicose veins No   Excessive bleeding after surgery or dental work No   Heavy menstrual periods requiring treatment No   Anemia No   Blood transfusions No        Would you refuse a blood transfusion? No   Breast problems No   Abnormalities of the uterus No   Abnormal pap smear No   Have you been treated for an emotional disturbance? No   Have you been physically, sexually, or emotionally hurt by someone? No   Have you been in a major accident or suffered serious trauma? No   Have you had surgical procedures? No        Have you  ever had any complications from anesthesia? No   Have you ever been hospitalized for a nonsurgical reason? No     Notes for positives: N/A    Prenatal Genetic Screening:    Do you have a history of any of the following Yes/No        A metabolic disorder (e.g. Insulin-dependent DM, PKU) No        Recurrent pregnancy loss No     Do you, the baby's father, or anyone in your families have Yes/No        Thalassemia AND MCV <80 No        Hemophilia No        Neural tube defect No        Congenital heart defect No        Sickle cell disease or trait No        Muscular dystrophy No        Cystic fibrosis No        Mental retardation or autism No        Down's syndrome No        Olvin-Sach's disease No        [employer]'s chorea No        Any other inherited genetic or chromosomal disorder No        A child with birth defects not listed above No     Infection History:  At high risk for coming in contact with HIV No   Ever treated for tuberculosis No   Ever received the BCG vaccine for tuberculosis No   Ever had genital herpes (or has your partner) No   Had a rash or viral illness since LMP No   Ever had a sexually transmitted infection No   Ever had chicken pox or the vaccine Yes   Ever had any other serious infectious disease No   Up to date with immunizations Yes   STI History None      PERSONAL/SOCIAL HISTORY  Social History     Socioeconomic History    Marital status: Single     Spouse name: None    Number of children: None    Years of education: None    Highest education level: None   Tobacco Use    Smoking status: Never     Passive exposure: Never    Smokeless tobacco: Never   Substance and Sexual Activity    Alcohol use: Not Currently    Drug use: Never    Sexual activity: Never   Social History Narrative    2/2024    Unemployed    Single    Children ages 4, 3, 1        Had surgery 5yrs ago     Social Determinants of Health     Financial Resource Strain: Low Risk  (2/7/2024)    Financial Resource Strain     Within the  "past 12 months, have you or your family members you live with been unable to get utilities (heat, electricity) when it was really needed?: No   Food Insecurity: Low Risk  (2/7/2024)    Food Insecurity     Within the past 12 months, did you worry that your food would run out before you got money to buy more?: No     Within the past 12 months, did the food you bought just not last and you didn t have money to get more?: No   Transportation Needs: Low Risk  (2/7/2024)    Transportation Needs     Within the past 12 months, has lack of transportation kept you from medical appointments, getting your medicines, non-medical meetings or appointments, work, or from getting things that you need?: No   Interpersonal Safety: Low Risk  (2/7/2024)    Interpersonal Safety     Do you feel physically and emotionally safe where you currently live?: Yes     Within the past 12 months, have you been hit, slapped, kicked or otherwise physically hurt by someone?: No     Within the past 12 months, have you been humiliated or emotionally abused in other ways by your partner or ex-partner?: No   Housing Stability: Low Risk  (2/7/2024)    Housing Stability     Do you have housing? : Yes     Are you worried about losing your housing?: No     Have you used any tobacco products, alcohol or any other drugs during this pregnancy before or after your knew you were pregnant? No    PHYSICAL EXAM:  BP 94/59   Pulse 92   Temp 98.4  F (36.9  C) (Oral)   Resp 15   Ht 1.454 m (4' 9.24\")   Wt 48.1 kg (106 lb)   LMP 09/18/2023 (Exact Date)   SpO2 99%   BMI 22.74 kg/m     GENERAL:  Pleasant pregnant female, alert, well groomed.  SKIN:  Warm and dry, without lesions or rashes  EYES:  PERRLA, EOM intact  MOUTH:  Buccal mucosa pink, moist without lesions.    NECK:  Thyroid without enlargement and nodules.  No cervical lymphadenopathy.  LUNGS:  Clear to auscultation.  HEART:  RRR without murmur.  ABDOMEN: Soft without masses , tenderness or organomegaly.  " No CVA tenderness.  Fundal height 23 cm.   MUSCULOSKELETAL:  Full range of motion.  EXTREMITIES:  No edema. No significant varicosities.     ASSESSMENT/PLAN  Shayna was seen today for prenatal care and refill request.    Diagnoses and all orders for this visit:    Supervision of normal intrauterine pregnancy in multigravida in second trimester  25w1d today by LMP presenting for new OB visit. Had dating US at pregnancy resource [facility], doesn't know the dating. Healthy thus far, feeling well. Will obtain new OB labs and anatomy US this week. She will return in 2 weeks for glucose testing. Hx alpha thalassemia - added on ferritin to labs to better evaluate potential superimposed iron deficiency, anticipating microcytic anemia.  -     ABO/Rh Type-HML; Future  -     Antibody Screen; Future  -     CBC with Plt; Future  -     Culture Urine; Future  -     Hepatitis B Surface Ag; Future  -     HIV Ag/Ab Screen [place]; Future  -     Lead, Blood; Future  -     Rubella Antibody IgG; Future  -     Syphilis Screen [place]; Future  -     Chlamydia trachomatis PCR  URINE; Future  -     Neisseria gonorrhoeae PCR  URINE; Future  -     Ferritin; Future  -     US OB > 14 Weeks; Future  -     Culture Urine  -     Chlamydia trachomatis PCR  URINE  -     Neisseria gonorrhoeae PCR  URINE    Need for hepatitis C screening test  -     Hepatitis C Screen Reflex to HCV RNA Quant and Genotype; Future    Alpha thalassemia minor    Other orders  -     Prenatal Vit-Fe Fumarate-FA (PRENATAL MULTIVITAMIN W/IRON) 27-0.8 MG tablet; Take 1 tablet by mouth daily    - Routine prenatal labs today.   -CBC, type and screen, rubella, HIV, gonorrhea/chlamydia, RPR, hepatitis B, urinalysis with culture.   - Pap smear due, will complete postpartum.     Discussed:  -Recommended weight gain of 15-25 lbs. for BMI of Body mass index is 22.74 kg/m ..  -Influenza vaccine received 2/7/24.  -Genetic screening options, including false positive rate of 5% with  screening tests and diagnostic options (chorionic villus sampling, amniocentesis), and patient declines.  -Safe medications during pregnancy. Will start taking prenatal vitamin daily.   -Healthy habits including not using tobacco or alcohol, exercising regularly and maintaining healthy diet.  -Information given on tips for dealing with nausea, healthy habits, exposures, safety, prenatal appointment schedule, and when to call the doctor.  -Recommendations for breastfeeding - she would like to try, had  her last babies for a couple of months.    Will follow up in 2 weeks for routine pre-linda care with 1 hour glucose tolerance test.    Maryam Lizama MD   [facility]     Precepted with: Dr. Faye

## 2024-03-12 NOTE — PROGRESS NOTES
Preceptor Attestation:   Patient seen, evaluated and discussed with the resident. I have verified the content of the note, which accurately reflects my assessment of the patient and the plan of care.  Supervising Physician:Deborah Faye MD  [facility]

## 2024-03-12 NOTE — TELEPHONE ENCOUNTER
OB intake completed via phone on 3/6/2024 with patient. Shayna is a 29 yo, Hmong, Non English speaking female,  with history of two miscarriages (1st and 4th pregnancy) and one ectopic pregnancy, 2023.  First two born were delivered at [facility], third and youngest child was born in [place].  Hx- anemia during pregnancy requiring oral iron supplement.  Otherwise all pregnancies were full term, normal, spontaneous, vaginal deliveries.  Shayna has been in the  for 5 years.  She and her , returned back from [place] to [place] in 2023. This pregnancy was planned, both Shayna and her  are happy and excited. ,  culturally, is Kahlil Emmanuel, 32 yo, Hmong male, works full time, computers- [employer]. 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     Reported, sure LMP of 2023, menses cycles are regular in pattern. Based on LMP, SHARIF is 2024. Shayna reports she was seen at around 20 weeks gestation at [facility] on [address] for pregnancy confirmation. Urine pregnancy test and basic ultrasound completed at [facility]. Will need to complete a release of information when patient comes in for initial OB visit. Another option would also be for Shayna to go to [facility], complete their JOHAN and see if there a possibility of being given a copy of visit, lab result and ultrasound report to bring in at NOB appointment. Currently 24 w 2 days gestation, has felt quickening about 4 weeks now. Has been doing ok thus far in pregnancy. Denies nausea and vomiting has been able to eat and drink without difficulty. Has noticed some mild cramping in abdomen when turn in bed. Has experienced small amount of vaginal discharge. Denies spotting or vaginal bleeding.     Personal and family medical history reviewed. No identified genetic disorders on either side of the families. Shayna informs writer, her  's cousin (paternal aunt's son) does not speak and hits himself. Shayna was unable to identify or inform writer of his given diagnosis.     NOB is scheduled with Dr Lizama 3/12/2024. No concerns voiced during intake, other than would prefer to see a female provider for prenatal care. Kali BARAJAS    Average Risk Category  No significant risk factors: No    At Risk Category (up to 3)  Teen pregnancy: No  Poor social situation: No  Domestic abuse: No  Financial difficulties: No  Smoker: No  H/O  deliver: No  H/O drug abuse: No  Non-English speaking: Yes  Advanced maternal age: No  GDM risks: No  Previous C/S: No  H/O PIH: No  H/O STIs: No  H/O mental health concerns: No  Onset care > 20 weeks: Yes  Other: little to lack of prenatal care thus far in pregnancy    High Risk Category (4 or more At Risk or)  Diabetes/GDM: No  Multiple gestation: No  Chronic hypertension: No  Significant hx of asthma: No  Fetal demise > 20 weeks: No  Positive tox screen: No  Current mental health treatment: No  Other: none    Risk: Average Risk   Date determined: 3/6/2024.  Kali BARAJAS

## 2024-03-13 LAB
C TRACH DNA SPEC QL NAA+PROBE: NEGATIVE
FERRITIN SERPL-MCNC: 13 NG/ML (ref 6–175)
HBV SURFACE AG SERPL QL IA: NONREACTIVE
HCV AB SERPL QL IA: NONREACTIVE
HIV 1+2 AB+HIV1 P24 AG SERPL QL IA: NONREACTIVE
LEAD BLDV-MCNC: <2 UG/DL
N GONORRHOEA DNA SPEC QL NAA+PROBE: NEGATIVE

## 2024-03-14 ENCOUNTER — ANCILLARY PROCEDURE (OUTPATIENT)
Dept: ULTRASOUND IMAGING | Facility: CLINIC | Age: 29
End: 2024-03-14
Payer: COMMERCIAL

## 2024-03-14 DIAGNOSIS — Z34.82 SUPERVISION OF NORMAL INTRAUTERINE PREGNANCY IN MULTIGRAVIDA IN SECOND TRIMESTER: ICD-10-CM

## 2024-03-14 LAB — BACTERIA UR CULT: ABNORMAL

## 2024-03-14 PROCEDURE — 76805 OB US >/= 14 WKS SNGL FETUS: CPT | Mod: TC | Performed by: RADIOLOGY

## 2024-03-14 NOTE — NURSING NOTE
Due to patient being non-English speaking/uses sign language, an  was used for this visit. Only for face-to-face interpretation by an external agency, date and length of interpretation can be found on the scanned worksheet.     name: Pastora hurd   Agency: [employer]  Language: Cheryl   Telephone number: .  Type of interpretation: Face-to-face, spoken

## 2024-03-15 NOTE — RESULT ENCOUNTER NOTE
Please call *with [facility] * with results. If no response, please try again in 1-2 days. If still no response, please route back to me to ensure patient gets the appropriate notification.    Shayna,    Your labwork from your recent visit has resulted. You have anemia - meaning low iron, causing low blood counts. I have sent in an iron supplement for you to take in addition to your prenatal vitamin which will help your body make more red blood cells.     Your urine grew out a small amount of bacteria - unless you are having burning when you pee, blood in your pee, etc. - we do not need to treat this. Let us know if you develop these symptoms.    If you have any questions or concerns, please feel free to give us a call at [phone].    Jeanette Lizama MD  [facility]

## 2024-03-15 NOTE — RESULT ENCOUNTER NOTE
"Please call *with [facility] * with results. If no response, please try again in 1-2 days. If still no response, please route back to me to ensure patient gets the appropriate notification.    Shayna,    Your ultrasound showed that baby's organs have all developed normally, and baby is growing healthy. There was a small \"bright spot\" seen in the heart that is nothing to be concerned about - it does not affect how the heart works, does not need any follow-up imaging, will go away on its own. We can talk more about this at your next visit, or I can give you a call if you have more questions about this.    If you have any questions or concerns, please feel free to give us a call at [phone].    Jeanette Lizama MD  [facility]"

## 2024-03-26 ENCOUNTER — OFFICE VISIT (OUTPATIENT)
Dept: FAMILY MEDICINE | Facility: CLINIC | Age: 29
End: 2024-03-26
Payer: COMMERCIAL

## 2024-03-26 VITALS
DIASTOLIC BLOOD PRESSURE: 62 MMHG | HEIGHT: 57 IN | WEIGHT: 107.12 LBS | OXYGEN SATURATION: 99 % | TEMPERATURE: 98.4 F | HEART RATE: 89 BPM | RESPIRATION RATE: 16 BRPM | BODY MASS INDEX: 23.11 KG/M2 | SYSTOLIC BLOOD PRESSURE: 94 MMHG

## 2024-03-26 DIAGNOSIS — O99.012 ANEMIA DURING PREGNANCY IN SECOND TRIMESTER: ICD-10-CM

## 2024-03-26 DIAGNOSIS — Z34.82 SUPERVISION OF NORMAL INTRAUTERINE PREGNANCY IN MULTIGRAVIDA IN SECOND TRIMESTER: Primary | ICD-10-CM

## 2024-03-26 DIAGNOSIS — N30.00 ACUTE CYSTITIS WITHOUT HEMATURIA: ICD-10-CM

## 2024-03-26 LAB
GLUCOSE 1H P 50 G GLC PO SERPL-MCNC: 143 MG/DL (ref 70–129)
HGB BLD-MCNC: 8.6 G/DL (ref 11.7–15.7)

## 2024-03-26 PROCEDURE — 82947 ASSAY GLUCOSE BLOOD QUANT: CPT

## 2024-03-26 PROCEDURE — 99213 OFFICE O/P EST LOW 20 MIN: CPT | Mod: GC

## 2024-03-26 PROCEDURE — 82950 GLUCOSE TEST: CPT

## 2024-03-26 PROCEDURE — 99207 PR PRENATAL VISIT: CPT | Mod: GC

## 2024-03-26 PROCEDURE — 36415 COLL VENOUS BLD VENIPUNCTURE: CPT

## 2024-03-26 RX ORDER — DIPHENHYDRAMINE HYDROCHLORIDE 50 MG/ML
50 INJECTION INTRAMUSCULAR; INTRAVENOUS
Status: CANCELLED
Start: 2024-04-02

## 2024-03-26 RX ORDER — EPINEPHRINE 1 MG/ML
0.3 INJECTION, SOLUTION, CONCENTRATE INTRAVENOUS EVERY 5 MIN PRN
Status: CANCELLED | OUTPATIENT
Start: 2024-04-02

## 2024-03-26 RX ORDER — ALBUTEROL SULFATE 90 UG/1
1-2 AEROSOL, METERED RESPIRATORY (INHALATION)
Status: CANCELLED
Start: 2024-04-02

## 2024-03-26 RX ORDER — METHYLPREDNISOLONE SODIUM SUCCINATE 125 MG/2ML
125 INJECTION, POWDER, LYOPHILIZED, FOR SOLUTION INTRAMUSCULAR; INTRAVENOUS
Status: CANCELLED
Start: 2024-04-02

## 2024-03-26 RX ORDER — HEPARIN SODIUM (PORCINE) LOCK FLUSH IV SOLN 100 UNIT/ML 100 UNIT/ML
5 SOLUTION INTRAVENOUS
Status: CANCELLED | OUTPATIENT
Start: 2024-04-02

## 2024-03-26 RX ORDER — ALBUTEROL SULFATE 0.83 MG/ML
2.5 SOLUTION RESPIRATORY (INHALATION)
Status: CANCELLED | OUTPATIENT
Start: 2024-04-02

## 2024-03-26 RX ORDER — MEPERIDINE HYDROCHLORIDE 25 MG/ML
25 INJECTION INTRAMUSCULAR; INTRAVENOUS; SUBCUTANEOUS EVERY 30 MIN PRN
Status: CANCELLED | OUTPATIENT
Start: 2024-04-02

## 2024-03-26 RX ORDER — HEPARIN SODIUM,PORCINE 10 UNIT/ML
5-20 VIAL (ML) INTRAVENOUS DAILY PRN
Status: CANCELLED | OUTPATIENT
Start: 2024-04-02

## 2024-03-26 RX ORDER — NITROFURANTOIN 25; 75 MG/1; MG/1
100 CAPSULE ORAL 2 TIMES DAILY
Qty: 6 CAPSULE | Refills: 0 | Status: SHIPPED | OUTPATIENT
Start: 2024-03-26 | End: 2024-03-29

## 2024-03-26 NOTE — PROGRESS NOTES
"Shayna Jerry is a 28 year old  female who presents to clinic for a follow up OB visit. She is currently 27w1d with an estimated date of delivery of 2024, by Last Menstrual Period   She denies headache, chest pain, shortness of breath, abdominal pain/contractions, vaginal bleeding, or abnormal discharge. She has felt baby move.     Previous concerns (this pregnancy):   - late to prenatal care (first visit at 25 weeks)  - severe anemia (8.7) in the setting of alpha thalassemia with superimposed iron deficiency - will send for IV iron  - asymptomatic bacteruria (50-100k E. Coli) - does have some urinary urgency (needs to go right after using the bathroom), some odor - will now treat  - normal anatomy ultrasound    New concerns today:   - having some lower abdominal discomfort with getting up from chair, walking around, doesn't remember this with her other pregnancies    OB History    Para Term  AB Living   7 3 3 0 3 3   SAB IAB Ectopic Multiple Live Births   2 0 1 0 3      # Outcome Date GA Lbr Armando/2nd Weight Sex Delivery Anes PTL Lv   7 Current            6 Ectopic 2023     ECTOPIC      5 Term 22 39w6d 06:00 / 00:03 2.83 kg (6 lb 3.8 oz) F Vag-Spont None N DAPHNE      Name: ROLAND JERRY      Apgar1: 9  Apgar5: 9   4 2022           3 Term 20 38w3d 05:35 / 00:05 3.17 kg (6 lb 15.8 oz) M Vag-Spont None N DAPHNE      Name: SHELBIE JERRY      Apgar1: 9  Apgar5: 9   2 Term 19 38w4d 05:27 / 00:18 2.637 kg (5 lb 13 oz) M Vag-Spont None  DAPHNE      Name: GEETA JERRY-SHAYNA      Apgar1: 8  Apgar5: 9   1 2019             OB Review of Systems:  Headache - No  Chest pain - No  Shortness of breath - No  Abdominal pain/contractions - Yes: lower abdomen with any movement  Vaginal bleeding - No  Vaginal discharge - No  Leg swelling - No    Physical exam:  BP 94/62   Pulse 89   Temp 98.4  F (36.9  C)   Resp 16   Ht 1.448 m (4' 9.01\")   Wt 48.6 kg (107 lb 1.9 oz)   LMP 2023 (Exact " Date)   SpO2 99%   BMI 23.17 kg/m      General: alert, female in no acute distress  Abdomen: gravid uterus appropriate for gestation age at 27 cm above pubic symphysis, non-tender, FHTs 140  Extremities: no edema    Assessment/Plan:  Shayna was seen today for prenatal care.    Diagnoses and all orders for this visit:    Supervision of normal intrauterine pregnancy in multigravida in second trimester  27 weeks 1 day today, late to prenatal care with first OB visit at 25 weeks.  Went over new OB lab results today including hemoglobin of 8.7 and E. coli bacteriuria.  Will send her for IV iron infusions.  Will also treat her bacteriuria as she is having urinary urgency and lower abdominal discomfort.  She did not passed the 1 hour glucose screening, result was 143, so she is scheduled for a 3-hour glucose test 1 week from today.  -     OB Hemoglobin; Future  -     Gestational glucose tolerance testing (GTT), 1 hour  -     glucose tolerance test (GLUCOLA) 16% oral liquid 50 g  -     Cancel: Treponema Abs w Reflex to RPR and Titer; Future  -     OB Hemoglobin  -     Glucose tolerance gest screen 1 hour; Future  -     Glucose tolerance gest screen 1 hour  -     Glucose tolerance gest std 100 gm 3 hr; Future    Acute cystitis without hematuria  -     nitroFURantoin macrocrystal-monohydrate (MACROBID) 100 MG capsule; Take 1 capsule (100 mg) by mouth 2 times daily for 3 days    Anemia during pregnancy in second trimester    Other orders  -     sodium chloride 0.9% BOLUS 250 mL  -     sodium chloride (PF) 0.9% PF flush 3-20 mL  -     heparin lock flush 10 UNIT/ML injection 5-20 mL  -     heparin 100 unit/mL injection 5 mL  -     alteplase (CATHFLO ACTIVASE) injection 2 mg  -     iron sucrose (VENOFER) 300 mg in 0.9% NaCl 290 mL intermittent infusion  -     diphenhydrAMINE (BENADRYL) injection 50 mg  -     famotidine (PEPCID) injection 20 mg  -     methylPREDNISolone sodium succinate (solu-MEDROL) injection 125 mg  -      EPINEPHrine PF (ADRENALIN) injection 0.3 mg  -     sodium chloride 0.9% BOLUS 500 mL  -     albuterol (PROVENTIL HFA/VENTOLIN HFA) inhaler  -     albuterol (PROVENTIL) neb solution 2.5 mg  -     meperidine (DEMEROL) injection 25 mg    Follow up in 2-3 weeks for routine prenatal visit.     Maryam Lizama MD   [facility] Resident    Precepted with: Dr. Faye

## 2024-03-27 LAB — GLUCOSE P FAST SERPL-MCNC: 139 MG/DL (ref 60–94)

## 2024-04-02 ENCOUNTER — LAB (OUTPATIENT)
Dept: LAB | Facility: CLINIC | Age: 29
End: 2024-04-02
Payer: COMMERCIAL

## 2024-04-02 DIAGNOSIS — Z34.82 SUPERVISION OF NORMAL INTRAUTERINE PREGNANCY IN MULTIGRAVIDA IN SECOND TRIMESTER: ICD-10-CM

## 2024-04-02 LAB
GESTATIONAL GTT 1 HR POST DOSE: 104 MG/DL (ref 60–179)
GESTATIONAL GTT 2 HR POST DOSE: 104 MG/DL (ref 60–154)
GLUCOSE P FAST SERPL-MCNC: 83 MG/DL (ref 60–94)

## 2024-04-02 PROCEDURE — 82951 GLUCOSE TOLERANCE TEST (GTT): CPT

## 2024-04-02 PROCEDURE — 36415 COLL VENOUS BLD VENIPUNCTURE: CPT

## 2024-04-02 PROCEDURE — 82952 GTT-ADDED SAMPLES: CPT

## 2024-04-03 ENCOUNTER — NURSE TRIAGE (OUTPATIENT)
Dept: FAMILY MEDICINE | Facility: CLINIC | Age: 29
End: 2024-04-03
Payer: COMMERCIAL

## 2024-04-03 ENCOUNTER — APPOINTMENT (OUTPATIENT)
Dept: INTERPRETER SERVICES | Facility: CLINIC | Age: 29
End: 2024-04-03
Payer: COMMERCIAL

## 2024-04-03 LAB — GESTATIONAL GTT 3 HR POST DOSE: 50 MG/DL (ref 60–139)

## 2024-04-03 NOTE — TELEPHONE ENCOUNTER
"Reason for Disposition   Blood glucose 70 mg/dl (3.9 mmol/l) or below, OR symptomatic, AND cause known    Answer Assessment - Initial Assessment Questions  1. SYMPTOMS: \"What symptoms are you concerned about?\"      Some shortness of breath, has anemia needing iron infusions. Not experiencing any s/s of hypoglycemia today. Ate lunch at noon today. Endorses sweating after eating and feeling full    8. OTHER SYMPTOMS: \"Do you have any symptoms?\" (e.g., fever, frequent urination, difficulty breathing, vomiting)      Denies    Protocols used: Diabetes - Low Blood Sugar-A-OH    Writer spoke to patient after bg of 50 resulted from yesterday 3h GTT. Bg of 50 at 3 hour aniket. Patient not currently experiencing any symptoms, ate at noon today.     Patient does endorse SOB but is currently awaiting infusions for anemia. Patient pregnant 28w2d, hx of alpha thalassemia. UTI on 3/26 treated with macrobid. Hx ruptured L  ectopic 7/2023. Denies history of GDM.     Patient stable today, routing to precepting to evaluate critical result form yesterday and further advise if intervention needed.     Cynthia BARAJAS  " vomiting blood

## 2024-04-03 NOTE — TELEPHONE ENCOUNTER
Writer received a message from  who had a warm transfer of the result. He recommend patient be seen tomorrow for follow up. Precautions to be given for hypoglycemic symptoms and when to present to ED/call 911 if needed. Writer called patient back with [facility] , no answer. Left detailed VM advising we would like to see her tomorrow with  for follow up on blood glucose.     If patient returns call please assist with scheudling tomorrow with  or any physician if times do not work for patient. Please also advise patient to call clinic if experiencing any palpitations, fatigue, increased shakiness and sweating, intense hunger, or confusion. Cynthia BARAJAS

## 2024-04-03 NOTE — TELEPHONE ENCOUNTER
Writer called patient again with [facility] , patient answered. Advised of recommended appointment tomorrow, patient agreeable and scheduled with . Red flag symptoms provided for hypoglycemia, present to ED. Also advised of snacks at home to have if experiencing any of the symptoms including dizziness, confusion, shakiness, excess hunger, sweating, palpitations. Patient verbalized understanding and agreeable to plan. Cynthia BARAJAS

## 2024-04-04 ENCOUNTER — OFFICE VISIT (OUTPATIENT)
Dept: FAMILY MEDICINE | Facility: CLINIC | Age: 29
End: 2024-04-04
Payer: COMMERCIAL

## 2024-04-04 VITALS
TEMPERATURE: 98.6 F | DIASTOLIC BLOOD PRESSURE: 62 MMHG | OXYGEN SATURATION: 99 % | HEIGHT: 57 IN | SYSTOLIC BLOOD PRESSURE: 96 MMHG | RESPIRATION RATE: 16 BRPM | HEART RATE: 94 BPM | WEIGHT: 109.8 LBS | BODY MASS INDEX: 23.69 KG/M2

## 2024-04-04 DIAGNOSIS — Z34.83 SUPERVISION OF NORMAL INTRAUTERINE PREGNANCY IN MULTIGRAVIDA IN THIRD TRIMESTER: Primary | ICD-10-CM

## 2024-04-04 DIAGNOSIS — E16.2 HYPOGLYCEMIA: ICD-10-CM

## 2024-04-04 LAB — GLUCOSE SERPL-MCNC: 86 MG/DL

## 2024-04-04 PROCEDURE — 36416 COLLJ CAPILLARY BLOOD SPEC: CPT

## 2024-04-04 PROCEDURE — 82947 ASSAY GLUCOSE BLOOD QUANT: CPT

## 2024-04-04 PROCEDURE — 99207 PR PRENATAL VISIT: CPT | Mod: GC

## 2024-04-04 RX ORDER — LANCETS
EACH MISCELLANEOUS
Qty: 100 EACH | Refills: 6 | Status: SHIPPED | OUTPATIENT
Start: 2024-04-04 | End: 2024-05-23

## 2024-04-04 NOTE — PROGRESS NOTES
"Shayna Jerry is a 28 year old  female who presents to clinic for a follow up OB visit. She is currently 28w3d with an estimated date of delivery of 2024, by Last Menstrual Period and 25 week US. She denies headache, chest pain, shortness of breath, abdominal pain/contractions, vaginal bleeding, or abnormal discharge. She has felt baby move.     Previous concerns (this pregnancy):   Late to prenatal care 25w  Iron Deficiency with hx of alpha thalassemia  UTI treated 3/26 with macrobid    New concerns today:   She had her gestational diabetes test yesterday which showed a low BG at 3 hours. She did not notice any symptoms during the test, but did feel a bit nauseated.     Fasting 83  1 hour 104  2 hour 104  3 hour 50    She took all of her macrobid and no longer has any urinary symptoms.     OB History    Para Term  AB Living   7 3 3 0 3 3   SAB IAB Ectopic Multiple Live Births   2 0 1 0 3      # Outcome Date GA Lbr Armando/2nd Weight Sex Delivery Anes PTL Lv   7 Current            6 Ectopic 2023     ECTOPIC      5 Term 22 39w6d 06:00 / 00:03 2.83 kg (6 lb 3.8 oz) F Vag-Spont None N DAPHNE      Name: ROLAND JERRY      Apgar1: 9  Apgar5: 9   4 2022           3 Term 20 38w3d 05:35 / 00:05 3.17 kg (6 lb 15.8 oz) M Vag-Spont None N DAPHNE      Name: SHELBIE JERRY      Apgar1: 9  Apgar5: 9   2 Term 19 38w4d 05:27 / 00:18 2.637 kg (5 lb 13 oz) M Vag-Spont None  DAPHNE      Name: SHELBIE JERRY      Apgar1: 8  Apgar5: 9   1 2019             Physical exam:  BP 96/62   Pulse 94   Temp 98.6  F (37  C)   Resp 16   Ht 1.448 m (4' 9.01\")   Wt 49.8 kg (109 lb 12.8 oz)   LMP 2023 (Exact Date)   SpO2 99%   BMI 23.75 kg/m      General: alert, female in no acute distress  Abdomen: gravid uterus appropriate for gestation age at 26 cm above pubic symphysis, non-tender, FHTs 142bpm  Extremities: no edema    Assessment/Plan:  Shayna was seen today for prenatal care and " results.    Diagnoses and all orders for this visit:    Supervision of normal intrauterine pregnancy in multigravida in third trimester    Hypoglycemia  -     Glucose; Future  -     Glucose  -     blood glucose monitoring (NO BRAND SPECIFIED) meter device kit; Use to test blood sugar as needed for symptoms of hypoglycemia. Preferred blood glucose meter OR supplies to accompany: Blood Glucose Monitor Brands: per insurance.  -     blood glucose (NO BRAND SPECIFIED) test strip; Use to test blood sugar as needed for symptoms of hypoglycemia. To accompany: Blood Glucose Monitor Brands: per insurance.  -     thin (NO BRAND SPECIFIED) lancets; Use with lanceting device. To accompany: Blood Glucose Monitor Brands: per insurance.      Discussed she passed her 3 hour glucose test. Unclear reason for glucose of 50 at 3 hour aniket - with no symptoms, potentially this was lab error. Glucose today in clinic is 86. Educated patient on symptoms of hypoglycemia. Sent prescription for glucose meter in case she notices symptoms.    Maryam Lizama MD   [facility] Family [facility] Resident    Precepted with: Dr. Rosenstein

## 2024-04-04 NOTE — PROGRESS NOTES
Preceptor Attestation:   Patient seen, evaluated and discussed with the resident Dr. Jeanette Lizama. I have verified the content of the note, which accurately reflects my assessment of the patient and the plan of care.    Etiology of hypoglycemia unclear. Lab error?    Supervising Physician:  Benjamin Rosenstein, MD, MA  [facility]  [facility]

## 2024-04-16 ENCOUNTER — INFUSION THERAPY VISIT (OUTPATIENT)
Dept: INFUSION THERAPY | Facility: HOSPITAL | Age: 29
End: 2024-04-16
Payer: COMMERCIAL

## 2024-04-16 VITALS
OXYGEN SATURATION: 97 % | HEART RATE: 100 BPM | DIASTOLIC BLOOD PRESSURE: 57 MMHG | SYSTOLIC BLOOD PRESSURE: 90 MMHG | RESPIRATION RATE: 16 BRPM | TEMPERATURE: 97.5 F

## 2024-04-16 DIAGNOSIS — O99.012 ANEMIA DURING PREGNANCY IN SECOND TRIMESTER: Primary | ICD-10-CM

## 2024-04-16 PROCEDURE — 96365 THER/PROPH/DIAG IV INF INIT: CPT

## 2024-04-16 PROCEDURE — 258N000003 HC RX IP 258 OP 636

## 2024-04-16 PROCEDURE — 96366 THER/PROPH/DIAG IV INF ADDON: CPT

## 2024-04-16 PROCEDURE — 250N000011 HC RX IP 250 OP 636

## 2024-04-16 RX ORDER — HEPARIN SODIUM,PORCINE 10 UNIT/ML
5-20 VIAL (ML) INTRAVENOUS DAILY PRN
Status: CANCELLED | OUTPATIENT
Start: 2024-04-18

## 2024-04-16 RX ORDER — DIPHENHYDRAMINE HYDROCHLORIDE 50 MG/ML
50 INJECTION INTRAMUSCULAR; INTRAVENOUS
Status: DISCONTINUED | OUTPATIENT
Start: 2024-04-16 | End: 2024-04-16 | Stop reason: HOSPADM

## 2024-04-16 RX ORDER — ALBUTEROL SULFATE 90 UG/1
1-2 AEROSOL, METERED RESPIRATORY (INHALATION)
Status: DISCONTINUED | OUTPATIENT
Start: 2024-04-16 | End: 2024-04-16 | Stop reason: HOSPADM

## 2024-04-16 RX ORDER — ALBUTEROL SULFATE 0.83 MG/ML
2.5 SOLUTION RESPIRATORY (INHALATION)
Status: CANCELLED | OUTPATIENT
Start: 2024-04-18

## 2024-04-16 RX ORDER — METHYLPREDNISOLONE SODIUM SUCCINATE 125 MG/2ML
125 INJECTION, POWDER, LYOPHILIZED, FOR SOLUTION INTRAMUSCULAR; INTRAVENOUS
Status: CANCELLED
Start: 2024-04-18

## 2024-04-16 RX ORDER — DIPHENHYDRAMINE HYDROCHLORIDE 50 MG/ML
50 INJECTION INTRAMUSCULAR; INTRAVENOUS
Status: CANCELLED
Start: 2024-04-18

## 2024-04-16 RX ORDER — METHYLPREDNISOLONE SODIUM SUCCINATE 125 MG/2ML
125 INJECTION, POWDER, LYOPHILIZED, FOR SOLUTION INTRAMUSCULAR; INTRAVENOUS
Status: DISCONTINUED | OUTPATIENT
Start: 2024-04-16 | End: 2024-04-16 | Stop reason: HOSPADM

## 2024-04-16 RX ORDER — MEPERIDINE HYDROCHLORIDE 25 MG/ML
25 INJECTION INTRAMUSCULAR; INTRAVENOUS; SUBCUTANEOUS EVERY 30 MIN PRN
Status: CANCELLED | OUTPATIENT
Start: 2024-04-18

## 2024-04-16 RX ORDER — MEPERIDINE HYDROCHLORIDE 25 MG/ML
25 INJECTION INTRAMUSCULAR; INTRAVENOUS; SUBCUTANEOUS EVERY 30 MIN PRN
Status: DISCONTINUED | OUTPATIENT
Start: 2024-04-16 | End: 2024-04-16 | Stop reason: HOSPADM

## 2024-04-16 RX ORDER — EPINEPHRINE 1 MG/ML
0.3 INJECTION, SOLUTION INTRAMUSCULAR; SUBCUTANEOUS EVERY 5 MIN PRN
Status: DISCONTINUED | OUTPATIENT
Start: 2024-04-16 | End: 2024-04-16 | Stop reason: HOSPADM

## 2024-04-16 RX ORDER — HEPARIN SODIUM (PORCINE) LOCK FLUSH IV SOLN 100 UNIT/ML 100 UNIT/ML
5 SOLUTION INTRAVENOUS
Status: CANCELLED | OUTPATIENT
Start: 2024-04-18

## 2024-04-16 RX ORDER — EPINEPHRINE 1 MG/ML
0.3 INJECTION, SOLUTION INTRAMUSCULAR; SUBCUTANEOUS EVERY 5 MIN PRN
Status: CANCELLED | OUTPATIENT
Start: 2024-04-18

## 2024-04-16 RX ORDER — ALBUTEROL SULFATE 90 UG/1
1-2 AEROSOL, METERED RESPIRATORY (INHALATION)
Status: CANCELLED
Start: 2024-04-18

## 2024-04-16 RX ORDER — ALBUTEROL SULFATE 0.83 MG/ML
2.5 SOLUTION RESPIRATORY (INHALATION)
Status: DISCONTINUED | OUTPATIENT
Start: 2024-04-16 | End: 2024-04-16 | Stop reason: HOSPADM

## 2024-04-16 RX ADMIN — IRON SUCROSE 300 MG: 20 INJECTION, SOLUTION INTRAVENOUS at 10:40

## 2024-04-16 RX ADMIN — SODIUM CHLORIDE 250 ML: 9 INJECTION, SOLUTION INTRAVENOUS at 10:27

## 2024-04-18 ENCOUNTER — INFUSION THERAPY VISIT (OUTPATIENT)
Dept: INFUSION THERAPY | Facility: HOSPITAL | Age: 29
End: 2024-04-18
Payer: COMMERCIAL

## 2024-04-18 VITALS
SYSTOLIC BLOOD PRESSURE: 83 MMHG | HEART RATE: 100 BPM | OXYGEN SATURATION: 99 % | TEMPERATURE: 98.3 F | DIASTOLIC BLOOD PRESSURE: 57 MMHG | RESPIRATION RATE: 16 BRPM

## 2024-04-18 DIAGNOSIS — O99.012 ANEMIA DURING PREGNANCY IN SECOND TRIMESTER: Primary | ICD-10-CM

## 2024-04-18 PROCEDURE — 96366 THER/PROPH/DIAG IV INF ADDON: CPT

## 2024-04-18 PROCEDURE — 250N000011 HC RX IP 250 OP 636

## 2024-04-18 PROCEDURE — 96365 THER/PROPH/DIAG IV INF INIT: CPT

## 2024-04-18 PROCEDURE — 258N000003 HC RX IP 258 OP 636

## 2024-04-18 RX ORDER — DIPHENHYDRAMINE HYDROCHLORIDE 50 MG/ML
50 INJECTION INTRAMUSCULAR; INTRAVENOUS
Status: CANCELLED
Start: 2024-04-20

## 2024-04-18 RX ORDER — HEPARIN SODIUM,PORCINE 10 UNIT/ML
5-20 VIAL (ML) INTRAVENOUS DAILY PRN
Status: CANCELLED | OUTPATIENT
Start: 2024-04-20

## 2024-04-18 RX ORDER — METHYLPREDNISOLONE SODIUM SUCCINATE 125 MG/2ML
125 INJECTION, POWDER, LYOPHILIZED, FOR SOLUTION INTRAMUSCULAR; INTRAVENOUS
Status: CANCELLED
Start: 2024-04-20

## 2024-04-18 RX ORDER — EPINEPHRINE 1 MG/ML
0.3 INJECTION, SOLUTION INTRAMUSCULAR; SUBCUTANEOUS EVERY 5 MIN PRN
Status: CANCELLED | OUTPATIENT
Start: 2024-04-20

## 2024-04-18 RX ORDER — HEPARIN SODIUM (PORCINE) LOCK FLUSH IV SOLN 100 UNIT/ML 100 UNIT/ML
5 SOLUTION INTRAVENOUS
Status: CANCELLED | OUTPATIENT
Start: 2024-04-20

## 2024-04-18 RX ORDER — MEPERIDINE HYDROCHLORIDE 25 MG/ML
25 INJECTION INTRAMUSCULAR; INTRAVENOUS; SUBCUTANEOUS EVERY 30 MIN PRN
Status: CANCELLED | OUTPATIENT
Start: 2024-04-20

## 2024-04-18 RX ORDER — ALBUTEROL SULFATE 0.83 MG/ML
2.5 SOLUTION RESPIRATORY (INHALATION)
Status: CANCELLED | OUTPATIENT
Start: 2024-04-20

## 2024-04-18 RX ORDER — ALBUTEROL SULFATE 90 UG/1
1-2 AEROSOL, METERED RESPIRATORY (INHALATION)
Status: CANCELLED
Start: 2024-04-20

## 2024-04-18 RX ADMIN — IRON SUCROSE 300 MG: 20 INJECTION, SOLUTION INTRAVENOUS at 10:57

## 2024-04-18 RX ADMIN — SODIUM CHLORIDE 250 ML: 9 INJECTION, SOLUTION INTRAVENOUS at 10:21

## 2024-04-18 NOTE — PROGRESS NOTES
Infusion Nursing Note:  Shayna Jerry presents today for venofer dose 2/3.    Patient seen by provider today: No   present during visit today: Yes; Language: Hmong.    Note: Shayna arrived ambulatory and in stable condition. States she felt well after first infusion, just a little tired. PIV placed in left AC and good blood return noted. Iron infused per orders. PIV removed upon completion and site covered with gauze and secured with coban. Will return on 4/23 for next appointment.      Intravenous Access:  Peripheral IV placed.    Treatment Conditions:  Not Applicable.      Post Infusion Assessment:  Patient tolerated infusion without incident.  Blood return noted pre and post infusion.  Site patent and intact, free from redness, edema or discomfort.  No evidence of extravasations.  Access discontinued per protocol.       Discharge Plan:   Patient and/or family verbalized understanding of discharge instructions and all questions answered.  Patient discharged in stable condition accompanied by: self.  Departure Mode: Ambulatory.      Nighat Clifton RN

## 2024-04-23 ENCOUNTER — INFUSION THERAPY VISIT (OUTPATIENT)
Dept: INFUSION THERAPY | Facility: HOSPITAL | Age: 29
End: 2024-04-23
Payer: COMMERCIAL

## 2024-04-23 VITALS
TEMPERATURE: 98.2 F | HEART RATE: 98 BPM | SYSTOLIC BLOOD PRESSURE: 90 MMHG | OXYGEN SATURATION: 97 % | DIASTOLIC BLOOD PRESSURE: 50 MMHG | RESPIRATION RATE: 16 BRPM

## 2024-04-23 DIAGNOSIS — O99.012 ANEMIA DURING PREGNANCY IN SECOND TRIMESTER: Primary | ICD-10-CM

## 2024-04-23 PROCEDURE — 96366 THER/PROPH/DIAG IV INF ADDON: CPT

## 2024-04-23 PROCEDURE — 250N000011 HC RX IP 250 OP 636

## 2024-04-23 PROCEDURE — 96365 THER/PROPH/DIAG IV INF INIT: CPT

## 2024-04-23 PROCEDURE — 258N000003 HC RX IP 258 OP 636

## 2024-04-23 RX ORDER — ALBUTEROL SULFATE 90 UG/1
1-2 AEROSOL, METERED RESPIRATORY (INHALATION)
Start: 2024-04-24

## 2024-04-23 RX ORDER — METHYLPREDNISOLONE SODIUM SUCCINATE 125 MG/2ML
125 INJECTION, POWDER, LYOPHILIZED, FOR SOLUTION INTRAMUSCULAR; INTRAVENOUS
Start: 2024-04-24

## 2024-04-23 RX ORDER — ALBUTEROL SULFATE 0.83 MG/ML
2.5 SOLUTION RESPIRATORY (INHALATION)
OUTPATIENT
Start: 2024-04-24

## 2024-04-23 RX ORDER — ALBUTEROL SULFATE 90 UG/1
1-2 AEROSOL, METERED RESPIRATORY (INHALATION)
Status: DISCONTINUED | OUTPATIENT
Start: 2024-04-23 | End: 2024-04-23 | Stop reason: HOSPADM

## 2024-04-23 RX ORDER — MEPERIDINE HYDROCHLORIDE 25 MG/ML
25 INJECTION INTRAMUSCULAR; INTRAVENOUS; SUBCUTANEOUS EVERY 30 MIN PRN
Status: DISCONTINUED | OUTPATIENT
Start: 2024-04-23 | End: 2024-04-23 | Stop reason: HOSPADM

## 2024-04-23 RX ORDER — ALBUTEROL SULFATE 0.83 MG/ML
2.5 SOLUTION RESPIRATORY (INHALATION)
Status: DISCONTINUED | OUTPATIENT
Start: 2024-04-23 | End: 2024-04-23 | Stop reason: HOSPADM

## 2024-04-23 RX ORDER — HEPARIN SODIUM,PORCINE 10 UNIT/ML
5-20 VIAL (ML) INTRAVENOUS DAILY PRN
OUTPATIENT
Start: 2024-04-24

## 2024-04-23 RX ORDER — MEPERIDINE HYDROCHLORIDE 25 MG/ML
25 INJECTION INTRAMUSCULAR; INTRAVENOUS; SUBCUTANEOUS EVERY 30 MIN PRN
OUTPATIENT
Start: 2024-04-24

## 2024-04-23 RX ORDER — METHYLPREDNISOLONE SODIUM SUCCINATE 125 MG/2ML
125 INJECTION, POWDER, LYOPHILIZED, FOR SOLUTION INTRAMUSCULAR; INTRAVENOUS
Status: DISCONTINUED | OUTPATIENT
Start: 2024-04-23 | End: 2024-04-23 | Stop reason: HOSPADM

## 2024-04-23 RX ORDER — EPINEPHRINE 1 MG/ML
0.3 INJECTION, SOLUTION INTRAMUSCULAR; SUBCUTANEOUS EVERY 5 MIN PRN
OUTPATIENT
Start: 2024-04-24

## 2024-04-23 RX ORDER — DIPHENHYDRAMINE HYDROCHLORIDE 50 MG/ML
50 INJECTION INTRAMUSCULAR; INTRAVENOUS
Start: 2024-04-24

## 2024-04-23 RX ORDER — EPINEPHRINE 1 MG/ML
0.3 INJECTION, SOLUTION INTRAMUSCULAR; SUBCUTANEOUS EVERY 5 MIN PRN
Status: DISCONTINUED | OUTPATIENT
Start: 2024-04-23 | End: 2024-04-23 | Stop reason: HOSPADM

## 2024-04-23 RX ORDER — HEPARIN SODIUM (PORCINE) LOCK FLUSH IV SOLN 100 UNIT/ML 100 UNIT/ML
5 SOLUTION INTRAVENOUS
OUTPATIENT
Start: 2024-04-24

## 2024-04-23 RX ORDER — DIPHENHYDRAMINE HYDROCHLORIDE 50 MG/ML
50 INJECTION INTRAMUSCULAR; INTRAVENOUS
Status: DISCONTINUED | OUTPATIENT
Start: 2024-04-23 | End: 2024-04-23 | Stop reason: HOSPADM

## 2024-04-23 RX ADMIN — SODIUM CHLORIDE 250 ML: 9 INJECTION, SOLUTION INTRAVENOUS at 10:06

## 2024-04-23 RX ADMIN — IRON SUCROSE 300 MG: 20 INJECTION, SOLUTION INTRAVENOUS at 10:10

## 2024-04-23 NOTE — PROGRESS NOTES
Infusion Nursing Note:  Shayna Jerry presents today for IV Iron Sucrose 3/3.    Patient seen by provider today: No   present during visit today: Not Applicable.    Note: Shayna arrived ambulatory and in stable condition. States she felt well after first infusion, just a little tired. PIV placed in left AC and good blood return noted. Iron infused per orders. PIV removed upon completion and site covered with gauze and secured with coban.       Intravenous Access:  Peripheral IV placed.    Treatment Conditions:  Not Applicable.      Post Infusion Assessment:  Patient tolerated infusion without incident.  Blood return noted pre and post infusion.  No evidence of extravasations.  Access discontinued per protocol.       Discharge Plan:   Discharge instructions reviewed with: Patient.  Patient discharged in stable condition accompanied by: self.  Departure Mode: Ambulatory.      Alaina Oro RN

## 2024-05-07 ENCOUNTER — OFFICE VISIT (OUTPATIENT)
Dept: FAMILY MEDICINE | Facility: CLINIC | Age: 29
End: 2024-05-07
Payer: COMMERCIAL

## 2024-05-07 VITALS
RESPIRATION RATE: 16 BRPM | HEART RATE: 82 BPM | OXYGEN SATURATION: 98 % | SYSTOLIC BLOOD PRESSURE: 93 MMHG | BODY MASS INDEX: 23.61 KG/M2 | TEMPERATURE: 97.8 F | WEIGHT: 109.12 LBS | DIASTOLIC BLOOD PRESSURE: 58 MMHG

## 2024-05-07 DIAGNOSIS — Z34.83 SUPERVISION OF NORMAL INTRAUTERINE PREGNANCY IN MULTIGRAVIDA IN THIRD TRIMESTER: Primary | ICD-10-CM

## 2024-05-07 DIAGNOSIS — O99.013 ANEMIA DURING PREGNANCY IN THIRD TRIMESTER: ICD-10-CM

## 2024-05-07 PROCEDURE — 99207 PR PRENATAL VISIT: CPT | Mod: GC

## 2024-05-07 PROCEDURE — 90715 TDAP VACCINE 7 YRS/> IM: CPT

## 2024-05-07 PROCEDURE — 90471 IMMUNIZATION ADMIN: CPT

## 2024-05-07 NOTE — PROGRESS NOTES
Prior to immunization administration, verified patients identity using patient s name and date of birth. Please see Immunization Activity for additional information.     Screening Questionnaire for Adult Immunization    Are you sick today?   No   Do you have allergies to medications, food, a vaccine component or latex?   No   Have you ever had a serious reaction after receiving a vaccination?   No   Do you have a long-term health problem with heart, lung, kidney, or metabolic disease (e.g., diabetes), asthma, a blood disorder, no spleen, complement component deficiency, a cochlear implant, or a spinal fluid leak?  Are you on long-term aspirin therapy?   No   Do you have cancer, leukemia, HIV/AIDS, or any other immune system problem?   No   Do you have a parent, brother, or sister with an immune system problem?   No   In the past 3 months, have you taken medications that affect  your immune system, such as prednisone, other steroids, or anticancer drugs; drugs for the treatment of rheumatoid arthritis, Crohn s disease, or psoriasis; or have you had radiation treatments?   No   Have you had a seizure, or a brain or other nervous system problem?   No   During the past year, have you received a transfusion of blood or blood    products, or been given immune (gamma) globulin or antiviral drug?   No   For women: Are you pregnant or is there a chance you could become       pregnant during the next month?   No   Have you received any vaccinations in the past 4 weeks?   No     Immunization questionnaire answers were all negative.      Patient instructed to remain in clinic for 15 minutes afterwards, and to report any adverse reactions.     Screening performed by Ruby Glover MA on 5/7/2024 at 9:30 AM.

## 2024-05-07 NOTE — PROGRESS NOTES
Shayna Jerry is a 28 year old  female who presents to clinic for a follow up OB visit. She is currently 33w1d with an estimated date of delivery of 2024, by Last Menstrual Period. Baby has been moving normally.     Previous concerns (this pregnancy):   - iron deficiency anemia (hemoglobin 8.7 with superimposed alpha thalassemia trait, expect baseline hemoglobin 9-10 per 2019 heme/onc consultation), s/p 3 venofer infusions , ,   - late to prenatal care at 25 weeks  - UTI 3/26 treated with macrobid  - failed 1 hour glucose test, passed 3 hour    New concerns today:   - none    OB ROS   Headache - No  Chest pain - No  Shortness of breath - No  Abdominal pain/contractions - No  Vaginal bleeding - No  Vaginal discharge - No  Leg swelling - No    OB History    Para Term  AB Living   7 3 3 0 3 3   SAB IAB Ectopic Multiple Live Births   2 0 1 0 3      # Outcome Date GA Lbr Armando/2nd Weight Sex Type Anes PTL Lv   7 Current            6 Ectopic 2023     ECTOPIC      5 Term 22 39w6d 06:00 / 00:03 2.83 kg (6 lb 3.8 oz) F Vag-Spont None N DAPHNE      Name: ROLAND JERRY      Apgar1: 9  Apgar5: 9   4 2022           3 Term 20 38w3d 05:35 / 00:05 3.17 kg (6 lb 15.8 oz) M Vag-Spont None N DAPHNE      Name: SHELBIE JERRY      Apgar1: 9  Apgar5: 9   2 Term 19 38w4d 05:27 / 00:18 2.637 kg (5 lb 13 oz) M Vag-Spont None  DAPHNE      Name: SHELBIE JERRY      Apgar1: 8  Apgar5: 9   1 2019               Physical exam:  BP 93/58   Pulse 82   Temp 97.8  F (36.6  C)   Resp 16   Wt 49.5 kg (109 lb 1.9 oz)   LMP 2023 (Exact Date)   SpO2 98%   BMI 23.61 kg/m      General: alert, female in no acute distress  Abdomen: gravid uterus appropriate for gestation age at 32 cm above pubic symphysis, non-tender, FHTs 120s  Extremities: no edema    Assessment/Plan:  Shayna was seen today for prenatal care.    Diagnoses and all orders for this visit:    Supervision of normal intrauterine  pregnancy in multigravida in third trimester  33 weeks 1 day today. Slow weight gain - did not gain any weight from 4 weeks ago - but adequate fundal height growth, will monitor. If concern for weight gain, would obtain growth ultrasound at next visit.   -     Breast Pump Order for DME - ONLY FOR DME    Anemia during pregnancy in third trimester  Iron deficiency (ferritin 13) and alpha thalassemia minor contributing to hemoglobin of 8.7 at first OB visit. Received 3 iron infusions, last was 2 weeks ago. At next visit, will check hemoglobin and ferritin. Would expect baseline hemoglobin ~9-10 per heme/onc consultation note from 2019.    Other orders  -     TDAP VACCINE (Adacel, Boostrix)  [[id number]]    Post-partum contraception plans: vasectomy vs LARC  Reviewed  care and baby-friendly practices done at hospital after delivery.  Discussed pre-term labor signs and symptoms and when to call/come in.   If you notice a decrease in your baby's movement. As a rule, if she perceives fewer than 10 movements in two hours at rest  If your begin to experience contractions that are 5 minutes or less apart and lasting for over 45 seconds, or if contractions are becoming more painful.  If you have any bleeding or leakage of fluids.   If you have a headache not resolved with tylenol, right upper abdominal pain, or sudden onset of swelling.    Follow up in 2 weeks for routine prenatal visit.     Labor Prep Discussion    Clinic/Hospital: [facility]  Partner Name:  Kahlil  Anyone else attending birth: No  Ultrasound predicts sex: Girl  Childrens names/ages: 2 boys (almost 5 and almost 4) and 1 girl (almost 2)  Previous labor experiences: quick labors, pushed <30 min with all, no hemorrhages  Pertinent History: none  Plans for labor pain management: None    Tdap: 24  Flu: 24 RSV: not in season COVID: declined  Hep B: neg     Post Partum   PP contraception: wants this to be last baby, interested in vasectomy vs  IUD/nexplanon  Hx PPD/Depression/Anxiety: no  Plans for post partum recovery/time off: works second shift , plans for 6-8 weeks off of work  Feeding preference: breast for 1 month, then formula  Feeding history: breastfeeding for a few weeks  Breast pump: wants - Rx sent 5/7/24  Car seat: has    Baby  Peds provider:  [facility]  Baby Meds: ok with all  Siblings required phototherapy: no    Maryam Lizama MD   [facility] Family Medicine Resident    Precepted with: Birdie Valero MD

## 2024-05-07 NOTE — PROGRESS NOTES
Preceptor Attestation:   Patient seen, evaluated and discussed with the resident. I have verified the content of the note, which accurately reflects my assessment of the patient and the plan of care.  Supervising Physician:Birdie Valero MD  [facility]

## 2024-05-23 ENCOUNTER — OFFICE VISIT (OUTPATIENT)
Dept: FAMILY MEDICINE | Facility: CLINIC | Age: 29
End: 2024-05-23
Payer: COMMERCIAL

## 2024-05-23 VITALS
BODY MASS INDEX: 24.23 KG/M2 | DIASTOLIC BLOOD PRESSURE: 58 MMHG | OXYGEN SATURATION: 98 % | WEIGHT: 112 LBS | HEART RATE: 85 BPM | RESPIRATION RATE: 18 BRPM | SYSTOLIC BLOOD PRESSURE: 92 MMHG

## 2024-05-23 DIAGNOSIS — Z34.83 SUPERVISION OF NORMAL INTRAUTERINE PREGNANCY IN MULTIGRAVIDA IN THIRD TRIMESTER: Primary | ICD-10-CM

## 2024-05-23 DIAGNOSIS — O99.013 ANEMIA DURING PREGNANCY IN THIRD TRIMESTER: ICD-10-CM

## 2024-05-23 LAB — HGB BLD-MCNC: 9.4 G/DL (ref 11.7–15.7)

## 2024-05-23 PROCEDURE — 99207 PR PRENATAL VISIT: CPT | Mod: GC

## 2024-05-23 PROCEDURE — 85018 HEMOGLOBIN: CPT

## 2024-05-23 PROCEDURE — 36415 COLL VENOUS BLD VENIPUNCTURE: CPT

## 2024-05-23 PROCEDURE — 82728 ASSAY OF FERRITIN: CPT

## 2024-05-23 NOTE — PROGRESS NOTES
Preceptor Attestation:   Patient seen, evaluated and discussed with the resident Dr. Jeanette Lizama. I have verified the content of the note, which accurately reflects my assessment of the patient and the plan of care.    Supervising Physician:  Benjamin Rosenstein, MD, MA  [facility]  [facility]

## 2024-05-23 NOTE — PROGRESS NOTES
Shayna Jerry is a 28 year old  female who presents to clinic for a follow up OB visit. She is currently 35w3d with an estimated date of delivery of  2024, by Last Menstrual Period. Baby has been moving normally.    Previous concerns (this pregnancy):   - iron deficiency anemia (hemoglobin 8.7 with superimposed alpha thalassemia trait, expect baseline hemoglobin 9-10 per 2019 heme/onc consultation), s/p 3 venofer infusions , ,   - late to prenatal care at 25 weeks  - UTI 3/26 treated with macrobid  - failed 1 hour glucose test, passed 3 hour    New concerns today:   - none    OB ROS   Headache - No  Chest pain - No  Shortness of breath - No  Abdominal pain/contractions - No  Vaginal bleeding - No  Vaginal discharge - No  Leg swelling - No      OB History    Para Term  AB Living   7 3 3 0 3 3   SAB IAB Ectopic Multiple Live Births   2 0 1 0 3      # Outcome Date GA Lbr Armando/2nd Weight Sex Type Anes PTL Lv   7 Current            6 Ectopic 2023     ECTOPIC      5 Term 22 39w6d 06:00 / 00:03 2.83 kg (6 lb 3.8 oz) F Vag-Spont None N DAPHNE      Name: ROLAND JERRY      Apgar1: 9  Apgar5: 9   4 2022           3 Term 20 38w3d 05:35 / 00:05 3.17 kg (6 lb 15.8 oz) M Vag-Spont None N DAPHNE      Name: SHELBIE JERRY      Apgar1: 9  Apgar5: 9   2 Term 19 38w4d 05:27 / 00:18 2.637 kg (5 lb 13 oz) M Vag-Spont None  DAPHNE      Name: SHELBIE JERRY      Apgar1: 8  Apgar5: 9   1 2019               Physical exam:  BP 92/58   Pulse 85   Resp 18   Wt 50.8 kg (112 lb)   LMP 2023 (Exact Date)   SpO2 98%   BMI 24.23 kg/m      General: alert, female in no acute distress  Abdomen: gravid uterus appropriate for gestation age at 33 cm above pubic symphysis, non-tender, FHTs 135, vertex presentation by Leopold's  Extremities: no edema    Assessment/Plan:  Shayna was seen today for prenatal care.    Diagnoses and all orders for this visit:    Supervision of normal intrauterine  pregnancy in multigravida in third trimester  35w3d today, feeling well. No contractions yet. Wishes to do GBS next time - son also seeing me today. Fundal heights have lagged a couple weeks behind, but consistently throughout pregnancy. Gained a few pounds from last visit.  -     Cancel: Group B strep PCR    Anemia during pregnancy in third trimester  Received 3 iron infusions, last a month ago. Also has alpha thalassemia trait. Saw heme/onc in the past who felt her baseline would always be between 9-10. Will recheck today.  -     Hemoglobin  -     Ferritin    She will return in 1 week for follow-up with group b strep swab, US for fetal presentation.    Maryam Lizama MD PGY-3  [facility]    Precepted with: Dr. Rosenstein      Labor Prep Discussion     Clinic/Hospital: [facility]  Partner Name:  Kahlil   Anyone else attending birth: No  Ultrasound predicts sex: Girl  Childrens names/ages: 2 boys (almost 5 and almost 4) and 1 girl (almost 2)  Previous labor experiences: quick labors, pushed <30 min with all, no hemorrhages  Pertinent History: none  Plans for labor pain management: None     Tdap: 5/7/24   Flu: 2/7/24      RSV: not in season     COVID: declined  Hep B: neg      Post Partum   PP contraception: wants this to be last baby, interested in vasectomy vs IUD/nexplanon  Hx PPD/Depression/Anxiety: no  Plans for post partum recovery/time off: works second shift , plans for 6-8 weeks off of work  Feeding preference: breast for 1 month, then formula  Feeding history: breastfeeding for a few weeks  Breast pump: wants - Rx sent 5/7/24  Car seat: has     Baby  Peds provider:  [facility]  Baby Meds: ok with all  Siblings required phototherapy: no

## 2024-05-24 LAB — FERRITIN SERPL-MCNC: 230 NG/ML (ref 6–175)

## 2024-05-28 NOTE — RESULT ENCOUNTER NOTE
Please call with results. If no response, please try again in 1-2 days. If still no response, please route back to me to ensure patient gets the appropriate notification.    Shayna,    Your hemoglobin was 9.4, a bit better than when we last checked. Your iron stores are good, so the IV infusions did their job. Jaylon's iron is still a bit low, I would keep trying to give the iron gummy when you can (doesn't need to be every day).    If you have any questions or concerns, please feel free to give us a call at [phone].    Jeanette Lizama MD  [facility]

## 2024-05-30 ENCOUNTER — ANCILLARY PROCEDURE (OUTPATIENT)
Dept: ULTRASOUND IMAGING | Facility: CLINIC | Age: 29
End: 2024-05-30
Payer: COMMERCIAL

## 2024-05-30 ENCOUNTER — OFFICE VISIT (OUTPATIENT)
Dept: FAMILY MEDICINE | Facility: CLINIC | Age: 29
End: 2024-05-30
Payer: COMMERCIAL

## 2024-05-30 VITALS
DIASTOLIC BLOOD PRESSURE: 59 MMHG | HEART RATE: 93 BPM | OXYGEN SATURATION: 98 % | WEIGHT: 115.8 LBS | RESPIRATION RATE: 16 BRPM | BODY MASS INDEX: 24.98 KG/M2 | TEMPERATURE: 97.7 F | HEIGHT: 57 IN | SYSTOLIC BLOOD PRESSURE: 90 MMHG

## 2024-05-30 DIAGNOSIS — Z34.83 SUPERVISION OF NORMAL INTRAUTERINE PREGNANCY IN MULTIGRAVIDA IN THIRD TRIMESTER: Primary | ICD-10-CM

## 2024-05-30 DIAGNOSIS — Z34.83 SUPERVISION OF NORMAL INTRAUTERINE PREGNANCY IN MULTIGRAVIDA IN THIRD TRIMESTER: ICD-10-CM

## 2024-05-30 PROCEDURE — 87653 STREP B DNA AMP PROBE: CPT

## 2024-05-30 PROCEDURE — 99207 PR PRENATAL VISIT: CPT | Mod: GC

## 2024-05-30 PROCEDURE — 76816 OB US FOLLOW-UP PER FETUS: CPT | Mod: TC | Performed by: STUDENT IN AN ORGANIZED HEALTH CARE EDUCATION/TRAINING PROGRAM

## 2024-05-30 NOTE — PROGRESS NOTES
Called pt to request he move his apt up by 30 minutes as MET has a  he needs to attend that afternoon. Moved pt's apt. Shayna Jerry is a 28 year old  female who presents to clinic for a follow up OB visit. She is currently 36w3d with an estimated date of delivery of 2024 via LMP . She denies headache, chest pain, shortness of breath, abdominal pain/contractions, vaginal bleeding, or abnormal discharge. She has felt baby move.     Previous concerns (this pregnancy):   - iron deficiency anemia (hemoglobin 8.7 with superimposed alpha thalassemia trait, expect baseline hemoglobin 9-10 per 2019 heme/onc consultation), s/p 3 venofer infusions , , , most recent Hb 9.4 and ferritin 230 on   - late to prenatal care at 25 weeks  - UTI 3/26 treated with macrobid  - failed 1 hour glucose test, passed 3 hour    New concerns today: No concerns     OB History    Para Term  AB Living   7 3 3 0 3 3   SAB IAB Ectopic Multiple Live Births   2 0 1 0 3      # Outcome Date GA Lbr Armando/2nd Weight Sex Type Anes PTL Lv   7 Current            6 Ectopic 2023     ECTOPIC      5 Term 22 39w6d 06:00 / 00:03 2.83 kg (6 lb 3.8 oz) F Vag-Spont None N DAPHNE      Name: ROLAND JERRY      Apgar1: 9  Apgar5: 9   4 2022           3 Term 20 38w3d 05:35 / 00:05 3.17 kg (6 lb 15.8 oz) M Vag-Spont None N DAPHNE      Name: GEETA JERRY-SHAYNA      Apgar1: 9  Apgar5: 9   2 Term 19 38w4d 05:27 / 00:18 2.637 kg (5 lb 13 oz) M Vag-Spont None  DAPHNE      Name: GEETA JERRY-SHAYNA      Apgar1: 8  Apgar5: 9   1 2019               Physical exam:  LMP 2023 (Exact Date)   Weight gain: 4.99 kg (11 lb) to date. Late to prenatal care, so do not have a clear pregravid BMI.   General: alert, female in no acute distress  Abdomen: gravid uterus appropriate for gestation age at 33.5 cm above pubic symphysis, non-tender, FHTs 135, US reveals vertex positioning  Extremities: no edema    Assessment/Plan:  Patient Active Problem List   Diagnosis    Alpha thalassemia minor    Ectopic pregnancy, tubal    Anemia during pregnancy in  second trimester     Supervision of normal intrauterine pregnancy in multigravida in third trimester  36w3d today, feeling well. No contractions yet. GBS swab completed today. Gained a few pounds from last visit. Fundal heights have lagged a couple weeks behind, but consistently throughout pregnancy. Today measuring 33.5 cm, so will get a growth US to monitor growth. Patient indicated that previous babies have been small, so likely a normal variant. Given that she was late to prenatal care, may also be related to a dating error.   - Growth US     Anemia during pregnancy in third trimester  Received 3 iron infusions, last a month ago. Also has alpha thalassemia trait. Saw heme/onc in the past who felt her baseline would always be between 9-10. Rechecked on 5/23 - Hb 9.4 and ferritin 230.     Reviewed expectations for final month of pregnancy and when to call/come in.  Group B strep discussed and culture collected.   Discussed pre-term labor signs and symptoms and when to call/come in.   Discussed labor pain options    Follow up in 1 week for routine prenatal visit.     Patient seen and discussed with resident, MD Luz Elena Go, MS4    I was present with the medical student who participated in the service and in the documentation of this note. I have verified the history and personally performed the physical exam and medical decision making, and have verified the content of the note, which accurately reflects my assessment of the patient and the plan of care.     Thu Yadav MD   [facility] Resident     Precepted with: Dr. Lance     Labor Prep Discussion     Clinic/Hospital: [facility]  Partner Name:  Kahlil   Anyone else attending birth: No  Ultrasound predicts sex: Girl  Childrens names/ages: 2 boys (almost 5 and almost 4) and 1 girl (almost 2)  Previous labor experiences: quick labors, pushed <30 min with all, no hemorrhages  Pertinent History: none  Plans for labor pain  management: None     Tdap: 5/7/24   Flu: 2/7/24      RSV: not in season     COVID: declined  Hep B: neg      Post Partum   PP contraception: wants this to be last baby, interested in vasectomy vs IUD/nexplanon  Hx PPD/Depression/Anxiety: no  Plans for post partum recovery/time off: works second shift , plans for 6-8 weeks off of work  Feeding preference: breast for 1 month, then formula  Feeding history: breastfeeding for a few weeks  Breast pump: wants - Rx sent 5/7/24  Car seat: has     Baby  Peds provider:  [facility]  Baby Meds: ok with all  Siblings required phototherapy: no

## 2024-05-30 NOTE — PROGRESS NOTES
I have reviewed the history and physical examination. I was present for key portions of the visit and agree with the assessment and plan as documented above by Dr. Yadav and Luz Elena Corral MSIII for 28 yr old  @ 36 wks here for prenatal care. FHT appropriate. FH discrepancy noted. Current issues include 1) iron-deficiency anemia 2) late to prenatal care - @25 wks 3) S<D - will check U/S.  Scanned for vertex. GBS swab sent. /term labor precautions given.  Continue routine prenatal care.     Andrew Lance MD  May 30, 2024  11:28 AM

## 2024-05-30 NOTE — NURSING NOTE
Due to patient being non-English speaking/uses sign language, an  was used for this visit. Only for face-to-face interpretation by an external agency, date and length of interpretation can be found on the scanned worksheet.     name: Jose Angel Berman  Agency: [employer]  Language: Cheryl   Telephone number: .  Type of interpretation: Face-to-face, spoken

## 2024-05-31 ENCOUNTER — APPOINTMENT (OUTPATIENT)
Dept: INTERPRETER SERVICES | Facility: CLINIC | Age: 29
End: 2024-05-31
Payer: COMMERCIAL

## 2024-05-31 LAB — GP B STREP DNA SPEC QL NAA+PROBE: POSITIVE

## 2024-06-07 ENCOUNTER — OFFICE VISIT (OUTPATIENT)
Dept: FAMILY MEDICINE | Facility: CLINIC | Age: 29
End: 2024-06-07
Payer: COMMERCIAL

## 2024-06-07 VITALS
SYSTOLIC BLOOD PRESSURE: 95 MMHG | DIASTOLIC BLOOD PRESSURE: 65 MMHG | TEMPERATURE: 96.8 F | OXYGEN SATURATION: 98 % | BODY MASS INDEX: 24.67 KG/M2 | RESPIRATION RATE: 16 BRPM | WEIGHT: 114 LBS | HEART RATE: 80 BPM

## 2024-06-07 DIAGNOSIS — Z34.83 SUPERVISION OF NORMAL INTRAUTERINE PREGNANCY IN MULTIGRAVIDA IN THIRD TRIMESTER: Primary | ICD-10-CM

## 2024-06-07 PROCEDURE — 99207 PR PRENATAL VISIT: CPT | Mod: GC

## 2024-06-07 NOTE — PROGRESS NOTES
Shayna Jerry is a 28 year old  female who presents to clinic for a follow up OB visit. She is currently 37w4d with an estimated date of delivery of 2024 via LMP. She denies headache, chest pain, shortness of breath, abdominal pain/contractions, vaginal bleeding, or abnormal discharge. She has felt baby move.     New concerns today: none, feeling yulissa atkins    Previous concerns (this pregnancy):   - iron deficiency anemia (hemoglobin 8.7 with superimposed alpha thalassemia trait, expect baseline hemoglobin 9-10 per 2019 heme/onc consultation), s/p 3 venofer infusions , , , most recent Hb 9.4 and ferritin 230 on   - late to prenatal care at 25 weeks  - UTI 3/26 treated with macrobid  - failed 1 hour glucose test, passed 3 hour    OB History    Para Term  AB Living   7 3 3 0 3 3   SAB IAB Ectopic Multiple Live Births   2 0 1 0 3      # Outcome Date GA Lbr Armando/2nd Weight Sex Type Anes PTL Lv   7 Current            6 Ectopic 2023     ECTOPIC      5 Term 22 39w6d 06:00 / 00:03 2.83 kg (6 lb 3.8 oz) F Vag-Spont None N DAPHNE      Name: ROLAND JERRY      Apgar1: 9  Apgar5: 9   4 2022           3 Term 20 38w3d 05:35 / 00:05 3.17 kg (6 lb 15.8 oz) M Vag-Spont None N DAPHNE      Name: SARAHMALE-SHAYNA      Apgar1: 9  Apgar5: 9   2 Term 19 38w4d 05:27 / 00:18 2.637 kg (5 lb 13 oz) M Vag-Spont None  DAPHNE      Name: GEETA JERRY-SHAYNA      Apgar1: 8  Apgar5: 9   1 2019               Physical exam:  LMP 2023 (Exact Date)   Weight gain below recommended but consistent: 6.713 kg (14 lb 12.8 oz) to date, out of recommended total of unclear pregravid BMI    General: alert, female in no acute distress  Abdomen: gravid uterus at 35 cm above pubic symphysis, non-tender, FHTs 130s, US reveals cephalic presentation  Extremities: no edema    Assessment/Plan:  Patient Active Problem List   Diagnosis    Alpha thalassemia minor    Ectopic pregnancy, tubal    Anemia during  pregnancy in second trimester     Given change in fetal weight to 28th percentile from 56th, repeating US to ensure stabilization of weight difference; today mom is feeling baby move; FHTs reassuring with accelerations noted.    GBS POSITIVE. Will need penicillin in labor. PCN allergic? no  Reviewed signs/symptoms of labor and when to present to the hospital.   Follow up in 1 week for routine prenatal visit, sooner if labor symptoms.       Labor Prep Discussion     Clinic/Hospital: [facility]  Partner Name:  Kahlil   Anyone else attending birth: No  Ultrasound predicts sex: Girl  Childrens names/ages: 2 boys (almost 5 and almost 4) and 1 girl (almost 2)  Previous labor experiences: quick labors, pushed <30 min with all, no hemorrhages  Pertinent History: none  Plans for labor pain management: None  GBS: positive     Tdap: 5/7/24   Flu: 2/7/24      RSV: not in season     COVID: declined  Hep B: neg      Post Partum   PP contraception: wants this to be last baby, interested in vasectomy vs IUD/nexplanon  Hx PPD/Depression/Anxiety: no  Plans for post partum recovery/time off: works second shift , plans for 6-8 weeks off of work  Feeding preference: breast for 1 month, then formula  Feeding history: breastfeeding for a few weeks  Breast pump: wants - Rx sent 5/7/24  Car seat: has     Baby  Peds provider:  [facility]  Baby Meds: ok with all  Siblings required phototherapy: no    Thu Yadav MD   [facility] Resident     Precepted with: Alaina Ma DO

## 2024-06-13 ENCOUNTER — OFFICE VISIT (OUTPATIENT)
Dept: FAMILY MEDICINE | Facility: CLINIC | Age: 29
End: 2024-06-13
Payer: COMMERCIAL

## 2024-06-13 ENCOUNTER — ANCILLARY PROCEDURE (OUTPATIENT)
Dept: ULTRASOUND IMAGING | Facility: CLINIC | Age: 29
End: 2024-06-13
Payer: COMMERCIAL

## 2024-06-13 VITALS
DIASTOLIC BLOOD PRESSURE: 60 MMHG | HEART RATE: 79 BPM | RESPIRATION RATE: 16 BRPM | WEIGHT: 114.4 LBS | BODY MASS INDEX: 25.73 KG/M2 | HEIGHT: 56 IN | TEMPERATURE: 98.1 F | SYSTOLIC BLOOD PRESSURE: 95 MMHG | OXYGEN SATURATION: 99 %

## 2024-06-13 DIAGNOSIS — Z34.83 SUPERVISION OF NORMAL INTRAUTERINE PREGNANCY IN MULTIGRAVIDA IN THIRD TRIMESTER: ICD-10-CM

## 2024-06-13 DIAGNOSIS — O99.013 ANEMIA DURING PREGNANCY IN THIRD TRIMESTER: Primary | ICD-10-CM

## 2024-06-13 DIAGNOSIS — B95.1 POSITIVE GBS TEST: ICD-10-CM

## 2024-06-13 DIAGNOSIS — O26.10 LOW MATERNAL WEIGHT GAIN, UNSPECIFIED TRIMESTER: ICD-10-CM

## 2024-06-13 DIAGNOSIS — O26.843 FUNDAL HEIGHT LOW FOR DATES IN THIRD TRIMESTER: ICD-10-CM

## 2024-06-13 LAB — HGB BLD-MCNC: 9.6 G/DL (ref 11.7–15.7)

## 2024-06-13 PROCEDURE — 36415 COLL VENOUS BLD VENIPUNCTURE: CPT

## 2024-06-13 PROCEDURE — 85018 HEMOGLOBIN: CPT

## 2024-06-13 PROCEDURE — 99207 PR PRENATAL VISIT: CPT | Mod: GC

## 2024-06-13 PROCEDURE — 76816 OB US FOLLOW-UP PER FETUS: CPT | Mod: TC | Performed by: RADIOLOGY

## 2024-06-13 NOTE — PROGRESS NOTES
Shayna Jerry is a 28 year old  female who presents to clinic for a follow up OB visit. She is currently 38w3d with an estimated date of delivery of 2024 via LMP. However, growth US obtained today due to concern for decrease in fetal weight (see below) and composite age by that scan was closer to 36w4d. However, normal growth interval was noted since last US which is reassuring.     New concerns today: none, feeling yulissa atkins, no fluid leakage, would like cervix checked     Previous concerns (this pregnancy):   - iron deficiency anemia (hemoglobin 8.7 with superimposed alpha thalassemia trait, expect baseline hemoglobin 9-10 per 2019 heme/onc consultation), s/p 3 venofer infusions , , , most recent Hb 9.4 and ferritin 230 on  - will repeat hgb today   - late to prenatal care at 25 weeks  - UTI 3/26 treated with macrobid  - failed 1 hour glucose test, passed 3 hour  - decrease in fetal weight (56th percentile -> 28th percentile on  US), fundal heights below expected measurement. Repeat US today - normal growth interval however is estimated a different gestational age based on imaging.   - GBS positive 24 - will require intrapartum penicillin     OB History    Para Term  AB Living   7 3 3 0 3 3   SAB IAB Ectopic Multiple Live Births   2 0 1 0 3      # Outcome Date GA Lbr Armando/2nd Weight Sex Type Anes PTL Lv   7 Current            6 Ectopic 2023     ECTOPIC      5 Term 22 39w6d 06:00 / 00:03 2.83 kg (6 lb 3.8 oz) F Vag-Spont None N DAPHNE      Name: ROLAND JERRY      Apgar1: 9  Apgar5: 9   4 2022           3 Term 20 38w3d 05:35 / 00:05 3.17 kg (6 lb 15.8 oz) M Vag-Spont None N DAPHNE      Name: GEETA JERRY-SHAYNA      Apgar1: 9  Apgar5: 9   2 Term 19 38w4d 05:27 / 00:18 2.637 kg (5 lb 13 oz) M Vag-Spont None  DAPHNE      Name: GEETA JERRY-SHAYNA      Apgar1: 8  Apgar5: 9   1 2019             Physical exam:  BP 95/60   Pulse 79   Temp 98.1  F (36.7  C)  "(Oral)   Resp 16   Ht 1.43 m (4' 8.3\")   Wt 51.9 kg (114 lb 6.4 oz)   LMP 09/18/2023 (Exact Date)   SpO2 99%   BMI 25.38 kg/m    Weight gain below recommended but consistent: 6.078 kg (13 lb 6.4 oz) to date, out of recommended total of unclear pregravid BMI    General: alert, female in no acute distress. Hmong  present.   Abdomen: gravid uterus at 36 cm above pubic symphysis, non-tender, FHTs 130s  Cervix: essentially closed (0.5-1 cm)  cervix but soft.   Extremities: no LE edema     Assessment/Plan:    Third trimester pregnancy   Doing well, no new concerns. Occasional yulissa atkins. Cervical check today with only minimal dilation. Growth US today which showed cephalic position.    - RTC to see primary OB provider in 1 week     Low maternal weight gain   Below expected fundal heights   Concern given decrease in fetal weight (56th percentile -> 28th percentile on 5/30 US) along with fundal heights below expected measurement. However, repeat US obtained today and shows appropriate growth interval. Fundal height also increased from 35 to 36 cm on todays check. Will continue to monitor, but possible inaccurate initial dating as opposed to SGA.     Anemia   Alpha thalassemia trait  Baseline hgb of 9-10 per prior heme onc notes. S/p IV venofer x 3 this pregnancy. Most recent hgb near baseline with good haim stores, repeat today also reassuring at 9.6. Will continue to monitor.     GBS Positive   Checked at last visit 5/30/24. Will require intrapartum penicillin.     Labor Prep Discussion     Clinic/Hospital: [facility]  Partner Name:  Kahlil   Anyone else attending birth: No  Ultrasound predicts sex: Girl  Childrens names/ages: 2 boys (almost 5 and almost 4) and 1 girl (almost 2)  Previous labor experiences: quick labors, pushed <30 min with all, no hemorrhages  Pertinent History: none  Plans for labor pain management: None  GBS: positive     Tdap: 5/7/24   Flu: 2/7/24      RSV: not in season     COVID: " declined  Hep B: neg      Post Partum   PP contraception: wants this to be last baby, interested in vasectomy vs IUD/nexplanon  Hx PPD/Depression/Anxiety: no  Plans for post partum recovery/time off: works second shift , plans for 6-8 weeks off of work  Feeding preference: breast for 1 month, then formula  Feeding history: breastfeeding for a few weeks  Breast pump: wants - Rx sent 5/7/24  Car seat: has     Baby  Peds provider:  [facility]  Baby Meds: ok with all  Siblings required phototherapy: no    Pili Geronimo MD PGY-2  [facility] Residency      Discussed with Dr Rosenstein   Note forwarded to primary OB provider Dr Lizama

## 2024-06-13 NOTE — PROGRESS NOTES
Preceptor Attestation:   Patient seen, evaluated and discussed with the resident Dr. Pili Geronimo. I have verified the content of the note, which accurately reflects my assessment of the patient and the plan of care.    Supervising Physician:  Benjamin Rosenstein, MD, MA  [facility] Faculty  [facility]

## 2024-06-14 ENCOUNTER — APPOINTMENT (OUTPATIENT)
Dept: INTERPRETER SERVICES | Facility: CLINIC | Age: 29
End: 2024-06-14
Payer: COMMERCIAL

## 2024-06-19 ENCOUNTER — HOSPITAL ENCOUNTER (INPATIENT)
Facility: HOSPITAL | Age: 29
LOS: 1 days | Discharge: HOME | End: 2024-06-20
Attending: FAMILY MEDICINE | Admitting: FAMILY MEDICINE
Payer: COMMERCIAL

## 2024-06-19 DIAGNOSIS — O99.012 ANEMIA DURING PREGNANCY IN SECOND TRIMESTER: Primary | ICD-10-CM

## 2024-06-19 DIAGNOSIS — O92.79 LACTATION SYMPTOM: ICD-10-CM

## 2024-06-19 PROBLEM — Z36.89 ENCOUNTER FOR TRIAGE IN PREGNANT PATIENT: Status: ACTIVE | Noted: 2024-06-19

## 2024-06-19 LAB
ABO/RH(D): NORMAL
ANTIBODY SCREEN: NEGATIVE
HGB BLD-MCNC: 9.2 G/DL (ref 11.7–15.7)
HGB BLD-MCNC: 9.9 G/DL (ref 11.7–15.7)
SPECIMEN EXPIRATION DATE: NORMAL
T PALLIDUM AB SER QL: NONREACTIVE

## 2024-06-19 PROCEDURE — 86900 BLOOD TYPING SEROLOGIC ABO: CPT

## 2024-06-19 PROCEDURE — 250N000011 HC RX IP 250 OP 636: Mod: JZ

## 2024-06-19 PROCEDURE — 85018 HEMOGLOBIN: CPT

## 2024-06-19 PROCEDURE — 36415 COLL VENOUS BLD VENIPUNCTURE: CPT

## 2024-06-19 PROCEDURE — 59400 OBSTETRICAL CARE: CPT | Mod: GC

## 2024-06-19 PROCEDURE — 258N000003 HC RX IP 258 OP 636: Mod: JZ

## 2024-06-19 PROCEDURE — 250N000011 HC RX IP 250 OP 636

## 2024-06-19 PROCEDURE — 120N000001 HC R&B MED SURG/OB

## 2024-06-19 PROCEDURE — 250N000009 HC RX 250

## 2024-06-19 PROCEDURE — 250N000013 HC RX MED GY IP 250 OP 250 PS 637

## 2024-06-19 PROCEDURE — 0UC97ZZ EXTIRPATION OF MATTER FROM UTERUS, VIA NATURAL OR ARTIFICIAL OPENING: ICD-10-PCS | Performed by: FAMILY MEDICINE

## 2024-06-19 PROCEDURE — 86780 TREPONEMA PALLIDUM: CPT

## 2024-06-19 PROCEDURE — 722N000001 HC LABOR CARE VAGINAL DELIVERY SINGLE

## 2024-06-19 RX ORDER — MISOPROSTOL 200 UG/1
400 TABLET ORAL
Status: DISCONTINUED | OUTPATIENT
Start: 2024-06-19 | End: 2024-06-19 | Stop reason: HOSPADM

## 2024-06-19 RX ORDER — MISOPROSTOL 200 UG/1
800 TABLET ORAL
Status: DISCONTINUED | OUTPATIENT
Start: 2024-06-19 | End: 2024-06-20 | Stop reason: HOSPADM

## 2024-06-19 RX ORDER — TRANEXAMIC ACID 10 MG/ML
1 INJECTION, SOLUTION INTRAVENOUS EVERY 30 MIN PRN
Status: DISCONTINUED | OUTPATIENT
Start: 2024-06-19 | End: 2024-06-19 | Stop reason: HOSPADM

## 2024-06-19 RX ORDER — NALOXONE HYDROCHLORIDE 0.4 MG/ML
0.2 INJECTION, SOLUTION INTRAMUSCULAR; INTRAVENOUS; SUBCUTANEOUS
Status: DISCONTINUED | OUTPATIENT
Start: 2024-06-19 | End: 2024-06-19 | Stop reason: HOSPADM

## 2024-06-19 RX ORDER — OXYTOCIN/0.9 % SODIUM CHLORIDE 30/500 ML
340 PLASTIC BAG, INJECTION (ML) INTRAVENOUS CONTINUOUS PRN
Status: DISCONTINUED | OUTPATIENT
Start: 2024-06-19 | End: 2024-06-19 | Stop reason: HOSPADM

## 2024-06-19 RX ORDER — PENICILLIN G POTASSIUM 5000000 [IU]/1
5 INJECTION, POWDER, FOR SOLUTION INTRAMUSCULAR; INTRAVENOUS ONCE
Status: COMPLETED | OUTPATIENT
Start: 2024-06-19 | End: 2024-06-19

## 2024-06-19 RX ORDER — LIDOCAINE 40 MG/G
CREAM TOPICAL
Status: DISCONTINUED | OUTPATIENT
Start: 2024-06-19 | End: 2024-06-19 | Stop reason: HOSPADM

## 2024-06-19 RX ORDER — ACETAMINOPHEN 325 MG/1
650 TABLET ORAL EVERY 4 HOURS PRN
Status: DISCONTINUED | OUTPATIENT
Start: 2024-06-19 | End: 2024-06-19 | Stop reason: HOSPADM

## 2024-06-19 RX ORDER — IBUPROFEN 800 MG/1
800 TABLET, FILM COATED ORAL EVERY 6 HOURS PRN
Status: DISCONTINUED | OUTPATIENT
Start: 2024-06-19 | End: 2024-06-20 | Stop reason: HOSPADM

## 2024-06-19 RX ORDER — LOPERAMIDE HCL 2 MG
4 CAPSULE ORAL
Status: DISCONTINUED | OUTPATIENT
Start: 2024-06-19 | End: 2024-06-19 | Stop reason: HOSPADM

## 2024-06-19 RX ORDER — OXYTOCIN 10 [USP'U]/ML
10 INJECTION, SOLUTION INTRAMUSCULAR; INTRAVENOUS
Status: DISCONTINUED | OUTPATIENT
Start: 2024-06-19 | End: 2024-06-20 | Stop reason: HOSPADM

## 2024-06-19 RX ORDER — CARBOPROST TROMETHAMINE 250 UG/ML
250 INJECTION, SOLUTION INTRAMUSCULAR
Status: DISCONTINUED | OUTPATIENT
Start: 2024-06-19 | End: 2024-06-19 | Stop reason: HOSPADM

## 2024-06-19 RX ORDER — ACETAMINOPHEN 325 MG/1
650 TABLET ORAL EVERY 4 HOURS PRN
Status: DISCONTINUED | OUTPATIENT
Start: 2024-06-19 | End: 2024-06-20 | Stop reason: HOSPADM

## 2024-06-19 RX ORDER — CARBOPROST TROMETHAMINE 250 UG/ML
250 INJECTION, SOLUTION INTRAMUSCULAR
Status: DISCONTINUED | OUTPATIENT
Start: 2024-06-19 | End: 2024-06-20 | Stop reason: HOSPADM

## 2024-06-19 RX ORDER — PROCHLORPERAZINE 25 MG
25 SUPPOSITORY, RECTAL RECTAL EVERY 12 HOURS PRN
Status: DISCONTINUED | OUTPATIENT
Start: 2024-06-19 | End: 2024-06-19 | Stop reason: HOSPADM

## 2024-06-19 RX ORDER — METHYLERGONOVINE MALEATE 0.2 MG/ML
200 INJECTION INTRAVENOUS
Status: DISCONTINUED | OUTPATIENT
Start: 2024-06-19 | End: 2024-06-20 | Stop reason: HOSPADM

## 2024-06-19 RX ORDER — FENTANYL CITRATE 50 UG/ML
50 INJECTION, SOLUTION INTRAMUSCULAR; INTRAVENOUS EVERY 30 MIN PRN
Status: DISCONTINUED | OUTPATIENT
Start: 2024-06-19 | End: 2024-06-19 | Stop reason: HOSPADM

## 2024-06-19 RX ORDER — OXYTOCIN 10 [USP'U]/ML
10 INJECTION, SOLUTION INTRAMUSCULAR; INTRAVENOUS
Status: DISCONTINUED | OUTPATIENT
Start: 2024-06-19 | End: 2024-06-19 | Stop reason: HOSPADM

## 2024-06-19 RX ORDER — METOCLOPRAMIDE HYDROCHLORIDE 5 MG/ML
10 INJECTION INTRAMUSCULAR; INTRAVENOUS EVERY 6 HOURS PRN
Status: DISCONTINUED | OUTPATIENT
Start: 2024-06-19 | End: 2024-06-19 | Stop reason: HOSPADM

## 2024-06-19 RX ORDER — LOPERAMIDE HCL 2 MG
2 CAPSULE ORAL
Status: DISCONTINUED | OUTPATIENT
Start: 2024-06-19 | End: 2024-06-19 | Stop reason: HOSPADM

## 2024-06-19 RX ORDER — LOPERAMIDE HCL 2 MG
2 CAPSULE ORAL
Status: DISCONTINUED | OUTPATIENT
Start: 2024-06-19 | End: 2024-06-20 | Stop reason: HOSPADM

## 2024-06-19 RX ORDER — PROCHLORPERAZINE MALEATE 10 MG
10 TABLET ORAL EVERY 6 HOURS PRN
Status: DISCONTINUED | OUTPATIENT
Start: 2024-06-19 | End: 2024-06-19 | Stop reason: HOSPADM

## 2024-06-19 RX ORDER — PENICILLIN G 3000000 [IU]/50ML
3 INJECTION, SOLUTION INTRAVENOUS EVERY 4 HOURS
Status: DISCONTINUED | OUTPATIENT
Start: 2024-06-19 | End: 2024-06-19 | Stop reason: HOSPADM

## 2024-06-19 RX ORDER — MODIFIED LANOLIN
OINTMENT (GRAM) TOPICAL
Status: DISCONTINUED | OUTPATIENT
Start: 2024-06-19 | End: 2024-06-20 | Stop reason: HOSPADM

## 2024-06-19 RX ORDER — MISOPROSTOL 200 UG/1
400 TABLET ORAL
Status: DISCONTINUED | OUTPATIENT
Start: 2024-06-19 | End: 2024-06-20 | Stop reason: HOSPADM

## 2024-06-19 RX ORDER — MISOPROSTOL 200 UG/1
800 TABLET ORAL
Status: DISCONTINUED | OUTPATIENT
Start: 2024-06-19 | End: 2024-06-19 | Stop reason: HOSPADM

## 2024-06-19 RX ORDER — OXYTOCIN/0.9 % SODIUM CHLORIDE 30/500 ML
340 PLASTIC BAG, INJECTION (ML) INTRAVENOUS CONTINUOUS PRN
Status: DISCONTINUED | OUTPATIENT
Start: 2024-06-19 | End: 2024-06-20 | Stop reason: HOSPADM

## 2024-06-19 RX ORDER — CITRIC ACID/SODIUM CITRATE 334-500MG
30 SOLUTION, ORAL ORAL
Status: DISCONTINUED | OUTPATIENT
Start: 2024-06-19 | End: 2024-06-19 | Stop reason: HOSPADM

## 2024-06-19 RX ORDER — KETOROLAC TROMETHAMINE 30 MG/ML
30 INJECTION, SOLUTION INTRAMUSCULAR; INTRAVENOUS
Status: COMPLETED | OUTPATIENT
Start: 2024-06-19 | End: 2024-06-19

## 2024-06-19 RX ORDER — LOPERAMIDE HCL 2 MG
4 CAPSULE ORAL
Status: DISCONTINUED | OUTPATIENT
Start: 2024-06-19 | End: 2024-06-20 | Stop reason: HOSPADM

## 2024-06-19 RX ORDER — BISACODYL 10 MG
10 SUPPOSITORY, RECTAL RECTAL DAILY PRN
Status: DISCONTINUED | OUTPATIENT
Start: 2024-06-19 | End: 2024-06-20 | Stop reason: HOSPADM

## 2024-06-19 RX ORDER — DOCUSATE SODIUM 100 MG/1
100 CAPSULE, LIQUID FILLED ORAL DAILY
Status: DISCONTINUED | OUTPATIENT
Start: 2024-06-19 | End: 2024-06-20 | Stop reason: HOSPADM

## 2024-06-19 RX ORDER — METOCLOPRAMIDE 10 MG/1
10 TABLET ORAL EVERY 6 HOURS PRN
Status: DISCONTINUED | OUTPATIENT
Start: 2024-06-19 | End: 2024-06-19 | Stop reason: HOSPADM

## 2024-06-19 RX ORDER — OXYTOCIN/0.9 % SODIUM CHLORIDE 30/500 ML
100-340 PLASTIC BAG, INJECTION (ML) INTRAVENOUS CONTINUOUS PRN
Status: DISCONTINUED | OUTPATIENT
Start: 2024-06-19 | End: 2024-06-20 | Stop reason: HOSPADM

## 2024-06-19 RX ORDER — NALOXONE HYDROCHLORIDE 0.4 MG/ML
0.4 INJECTION, SOLUTION INTRAMUSCULAR; INTRAVENOUS; SUBCUTANEOUS
Status: DISCONTINUED | OUTPATIENT
Start: 2024-06-19 | End: 2024-06-19 | Stop reason: HOSPADM

## 2024-06-19 RX ORDER — IBUPROFEN 800 MG/1
800 TABLET, FILM COATED ORAL
Status: COMPLETED | OUTPATIENT
Start: 2024-06-19 | End: 2024-06-19

## 2024-06-19 RX ORDER — HYDROCORTISONE 25 MG/G
CREAM TOPICAL 3 TIMES DAILY PRN
Status: DISCONTINUED | OUTPATIENT
Start: 2024-06-19 | End: 2024-06-20 | Stop reason: HOSPADM

## 2024-06-19 RX ORDER — ONDANSETRON 2 MG/ML
4 INJECTION INTRAMUSCULAR; INTRAVENOUS EVERY 6 HOURS PRN
Status: DISCONTINUED | OUTPATIENT
Start: 2024-06-19 | End: 2024-06-19 | Stop reason: HOSPADM

## 2024-06-19 RX ORDER — ONDANSETRON 4 MG/1
4 TABLET, ORALLY DISINTEGRATING ORAL EVERY 6 HOURS PRN
Status: DISCONTINUED | OUTPATIENT
Start: 2024-06-19 | End: 2024-06-19 | Stop reason: HOSPADM

## 2024-06-19 RX ORDER — TRANEXAMIC ACID 10 MG/ML
1 INJECTION, SOLUTION INTRAVENOUS EVERY 30 MIN PRN
Status: DISCONTINUED | OUTPATIENT
Start: 2024-06-19 | End: 2024-06-20 | Stop reason: HOSPADM

## 2024-06-19 RX ORDER — METHYLERGONOVINE MALEATE 0.2 MG/ML
200 INJECTION INTRAVENOUS
Status: DISCONTINUED | OUTPATIENT
Start: 2024-06-19 | End: 2024-06-19 | Stop reason: HOSPADM

## 2024-06-19 RX ADMIN — Medication 340 ML/HR: at 11:26

## 2024-06-19 RX ADMIN — KETOROLAC TROMETHAMINE 30 MG: 30 INJECTION, SOLUTION INTRAMUSCULAR; INTRAVENOUS at 12:09

## 2024-06-19 RX ADMIN — PENICILLIN G 3 MILLION UNITS: 3000000 INJECTION, SOLUTION INTRAVENOUS at 10:46

## 2024-06-19 RX ADMIN — ACETAMINOPHEN 650 MG: 325 TABLET ORAL at 14:49

## 2024-06-19 RX ADMIN — METOCLOPRAMIDE 10 MG: 5 INJECTION, SOLUTION INTRAMUSCULAR; INTRAVENOUS at 10:31

## 2024-06-19 RX ADMIN — PENICILLIN G POTASSIUM 5 MILLION UNITS: 5000000 POWDER, FOR SOLUTION INTRAMUSCULAR; INTRAPLEURAL; INTRATHECAL; INTRAVENOUS at 07:08

## 2024-06-19 RX ADMIN — MISOPROSTOL 800 MCG: 200 TABLET ORAL at 14:21

## 2024-06-19 RX ADMIN — METHYLERGONOVINE MALEATE 200 MCG: 0.2 INJECTION INTRAVENOUS at 14:02

## 2024-06-19 RX ADMIN — ACETAMINOPHEN 650 MG: 325 TABLET ORAL at 20:04

## 2024-06-19 ASSESSMENT — ACTIVITIES OF DAILY LIVING (ADL)
ADLS_ACUITY_SCORE: 18
ADLS_ACUITY_SCORE: 24
ADLS_ACUITY_SCORE: 18
ADLS_ACUITY_SCORE: 24
ADLS_ACUITY_SCORE: 18
ADLS_ACUITY_SCORE: 24
ADLS_ACUITY_SCORE: 18
ADLS_ACUITY_SCORE: 18
ADLS_ACUITY_SCORE: 24
ADLS_ACUITY_SCORE: 18

## 2024-06-19 NOTE — PROGRESS NOTES
Report given to Jeanette BARAJAS.  Patient has had stable vitals, denies light headedness or dizziness.  No clots since 1425 when Dr. Bowman did a sweep in room.  Patient received methergine IM and cytotec rectally.  Plan is for hemoglobin in the morning.  Total QBL is 785 ml.

## 2024-06-19 NOTE — PLAN OF CARE
Patient gave birth at 1119, VSS,  ml.  Voided 300 ml in toilet, denies dizziness or light headedness.  Breastfeeding infant.  Fundus is firm, at Umbulicus, and light rubra lochia.  Toradol IV given for pain in perineum.   Problem: Adult Inpatient Plan of Care  Goal: Absence of Hospital-Acquired Illness or Injury  Outcome: Progressing     Problem: Adult Inpatient Plan of Care  Goal: Optimal Comfort and Wellbeing  Outcome: Progressing  Intervention: Monitor Pain and Promote Comfort  Recent Flowsheet Documentation  Taken 6/19/2024 1205 by Ondina Vilchis RN  Pain Management Interventions: medication (see MAR)   Goal Outcome Evaluation:

## 2024-06-19 NOTE — PROGRESS NOTES
Has passed large clot X2 since delivery.  Uterus firm, 2cm above umbilicus, no active bleeding.  Did get Pitocin and now Methergine given.     Following my initial assessment, Dr Bowman also came to assess patient and did a sweep of the lower uterine segment during which another smaller clot was passed and there was some active bleeding with fundal massage.  Rectal cytotec was given.  The uterus felt firm at the end of the exam.    Discussed patient with Dr. Sierra

## 2024-06-19 NOTE — PLAN OF CARE
Pt rates her pain around a 3. She has PRN meds available. Up independently in room. No clots since writer took over at 1600. Pt was given cytotec, pitocin and methergine prior to coming over. Mom is breastfeeding and bottle feeding. Since being in writers care baby has only been bottle fed.  FOB is here and is supportive.   She is voiding well. Fundus is firm with scant amount of lochia.    Problem: Adult Inpatient Plan of Care  Goal: Plan of Care Review  Description: The Plan of Care Review/Shift note should be completed every shift.  The Outcome Evaluation is a brief statement about your assessment that the patient is improving, declining, or no change.  This information will be displayed automatically on your shift  note.  Outcome: Progressing     Problem: Postpartum (Vaginal Delivery)  Goal: Optimal Pain Control and Function  Outcome: Progressing  Intervention: Prevent or Manage Pain  Recent Flowsheet Documentation  Taken 6/19/2024 1615 by Jeanette Christine, RN  Pain Management Interventions: rest  Perineal Care:   perineal spray bottle/warm water use encouraged   perineal hygiene encouraged   perineum cleansed  Taken 6/19/2024 1551 by Jeanette Christine, RN  Pain Management Interventions: rest   Goal Outcome Evaluation:

## 2024-06-19 NOTE — L&D DELIVERY NOTE
Delivery Summary  [facility]  Date of Service: 2024    Name      Shayna Jerry         1995  MRN       [id number]  PCP        Maryam Garzon at [facility]    DELIVERY NARRATIVE    On 2024 Shayna Jerry delivered a viable female infant at 39w2d with apgars of 9 and 9 via Vaginal, Spontaneous Delivery.    Shayna presented to Maternity Care on 2024 for assessment of SROM and contractions.     Her group B Strep (GBS) carrier status was positive. She received 1.5 intrapartum doses of IV penicillin. She received nothing for induction/augmentation. She received nitrous oxide for analgesia.    Delivery was via vaginal, spontaneous delivery to a sterile field under   anesthesia. Infant delivered in vertex       position. Shoulders delivered without difficulty. The baby was placed on the patient's abdomen. Cord complications: none . Delayed cord clamping was performed. 3 vessels  were noted.     Placenta delivered at 2024 11:28 AM . Placenta was noted to be intact. Fundal massage performed and fundus found to be firm. The following uterotonics were given: Pitocin (IV). Perineum, vagina, cervix were inspected, and the following lacerations were noted: None. QBL: 18 mL.    Excellent hemostasis was noted. Needle and sponge count correct. Infant and patient in delivery room in good and stable condition.   _________________    GA: 39w2d  GP:   Labor Complications: None   Additional Complications:    QBL: 18 mL  Delivery Type: Vaginal, Spontaneous   Duration of Ruptured Membranes: rupture date, rupture time, delivery date, or delivery time have not been documented   Weight: 3.205 kg (7 lb 1.1 oz)   Apgar scores: 9 , 9         Claritza, Female-Shayna [[id number]]      Labor Event Times      Dilation complete date: 24 Complete time: 11:02 AM   Start pushing date/time: 2024 1110        Augmentation: None    "    Delivery/Placenta Date and Time      Delivery Date: 24 Delivery Time: 11:19 AM   Placenta Date/Time: 2024 11:28 AM  Oxytocin given at the time of delivery: after delivery of baby  Delivering clinician: Omi De León MD   Other personnel present at delivery:  Provider Role   Maryam Lizama MD Resident   Manuel Rod MD MD Sulungaine, Elizabeth J, RN Delivery Nurse   Madai Carrizales RN Registered Nurse             Vaginal Counts       Initial count performed by 2 team members:  Two Team Members   Omi Cheatham, Resident Madai Carrizales RN         Needles Suture Needles Sponges (RETIRED) Instruments   Initial counts 0 0 5    Added to count       Relief counts       Final counts 0 0 5            Placed during labor Accounted for at the end of labor   FSE NA NA   IUPC NA NA   Cervidil NA NA           Relief count performed by 2 team members:  Two Team Members   Omi Cheatham, Resident Callie Vilchis RN             Final count correct?: Yes  Pre-Birth Team Brief: Complete  Post-Birth Team Debrief: Complete       Apgars    Living status: Living   1 Minute 5 Minute 10 Minute 15 Minute 20 Minute   Skin color: 1  1       Heart rate: 2  2       Reflex irritability: 2  2       Muscle tone: 2  2       Respiratory effort: 2  2       Total: 9  9              Cord      Vessels: 3 Vessels    Cord Complications: None               Cord Blood Disposition: Lab    Gases Sent?: No    Delayed cord clamping?: Yes    Cord Clamping Delay (seconds):  seconds           [place] Resuscitation    Methods: None       [place] Measurements      Weight: 7 lb 1.1 oz Length: 1' 7.5\"     Head circumference: 34.3 cm           Skin to Skin and Feeding Plan      Skin to skin initiation date/time:       Skin to skin end date/time: 1841           Labor Events and Shoulder Dystocia    Fetal Tracing Prior to Delivery: Category 1  Shoulder dystocia present?: Neg       Delivery (Maternal) (Provider to Complete) " ([id number])    Episiotomy: None  Perineal lacerations: None    Est. blood loss (mL): 50  Repair suture: None  Genital tract inspection done: Pos       Blood Loss  Mother: Shayna Jerry #[id number]     Start of Mother's Information      Delivery Blood Loss  06/18/24 2319 - 06/19/24 1149      EBL (mL) Hospital Encounter 50 mL    Delivery QBL (mL) Hospital Encounter 18 mL    Total  68 mL               End of Mother's Information  Mother: Shayna Jerry #[id number]                Delivery - Provider to Complete ([id number])    Delivering clinician: Omi De León MD  Delivery Type (Choose the 1 that will go to the Birth History): Vaginal, Spontaneous                         Other personnel:  Provider Role   Maryam Lizama MD Resident   Manuel Rod MD MD Sulungaine, Elizabeth J, RN Delivery Nurse   Madai Carrizales RN Registered Nurse                    Placenta    Date/Time: 6/19/2024 11:28 AM  Removal: Spontaneous  Disposition: Hospital disposal             Presentation and Position    Presentation: Vertex                    Completed by:   Omi De León MD  [facility]   6/19/2024 11:49 AM

## 2024-06-19 NOTE — PROGRESS NOTES
Data: Patient presented to [facility]: 2024  6:20 AM.  Reason for maternal/fetal assessment is uterine contractions and leaking vaginal fluid. Patient reports water broke at 5am and contractions started shortly before. Patient denies vaginal bleeding, abdominal pain, nausea, vomiting, headache, visual disturbances, epigastric or RUQ pain, significant edema. Patient reports fetal movement is normal. Patient is a 39w2d .  Support person is present.     Fetal HR baseline was  120, variability is  moderate. Accelerations: present. Decelerations: absent .  Cervix 4.5 cm dilated and 70% effaced. Fetal station -2. Fetal presentation   per cervical exam. Membranes: fluid visually apparent externally.    Vital signs wnl. Patient reports pain and is coping.     Action: Verbal consent for EFM. Triage assessment completed. Will update provider for further orders.

## 2024-06-19 NOTE — H&P
[facility]    History and Physical  Obstetrics and Gynecology     Date of Admission:  2024    Assessment & Plan   Shayna Jerry is a 28 year old  at 39w2d who presents with spontaneous rupture of membranes, contractions. Cervical exam on arrival 4.5/70%/-2 with contractions every 4-5 minutes.     Prenatal course notable for GBS positive (penicillin ordered), anemia of pregnancy superimposed on alpha thalesemmia trait (received IV venofer infusions x 3, at baseline hgb as of 24), concern for low fetal weight but with a recent growth interval on most recent growth US 24.     ASSESSMENT:   IUP @ 39w2d in early labor.  FHT  Category  I    PLAN:   Admit - see IP orders  Penicillin ordered for GBS     History of Present Illness   Shayna Jerry is a 28 year old female  at 39w2d [facility]. Spontaneous rupture prior to arrival with regular contractions q4-5 minutes.     PRENATAL COURSE  Prenatal course was complicated by GBS (+), iron deficiency anemia superimposed on alpha thalassemia trait s/p IV venofer x 3, concern for low fetal weight/slow fetal growth trend but growth interval reassuring on most recent growth US.     Blood type O Pos   GBS positive   Rubella immune  HIV negative  Hepatitis B, syphilis non-reactive    Past Medical History    Past medical history reviewed with no previously diagnosed medical problems.    Past Surgical History   Past surgical history reviewed with no previous surgeries identified.    Prior to Admission Medications   Prior to Admission Medications   Prescriptions Last Dose Informant Patient Reported? Taking?   Prenatal Vit-Fe Fumarate-FA (PRENATAL MULTIVITAMIN W/IRON) 27-0.8 MG tablet   No No   Sig: Take 1 tablet by mouth daily      Facility-Administered Medications: None     Allergies   No Known Allergies    Social History   I have reviewed this patient's social history and updated it with pertinent information if  needed. Shayna Jerry  reports that she has never smoked. She has never been exposed to tobacco smoke. She has never used smokeless tobacco. She reports that she does not currently use alcohol. She reports that she does not use drugs.    Family History   Family history reviewed with patient and is noncontributory.    Immunization History   TDAP given this pregnancy 5/7/24.   Influenza vaccine given this pregnancy 2/7/24.   Covid vaccine declined.     Physical Exam   Temp: 99.2  F (37.3  C) Temp src: Oral BP: 108/70 Pulse: (!) 22   Resp: 18        Vital Signs with Ranges  Temp:  [98.3  F (36.8  C)-99.2  F (37.3  C)] 99.2  F (37.3  C)  Pulse:  [22] 22  Resp:  [18] 18  BP: (108-110)/(70) 108/70    Abdomen: gravid, single vertex fetus, non-tender  Constitutional: healthy, alert  Respiratory: No increased work of breathing, good air exchange.  Cardiovascular: RRR    Fetal assessment:              Heart Rate baseline: 130              Variability: moderate    Accelerations: present   Decelerations: absent              FHR Category: Category 1  Cervical exam:              Dilation: 4.5 cm              Effacement: 70%              Station: -2   Uterine activity:              Contraction frequency: every 4-5 minutes    Pili Geronimo MD PGY-2  [facility] Medicine Residency      Discussed with Dr. Rod   Also discussed and provided hand off to primary OB provider Dr. Lizama, who is now on site

## 2024-06-19 NOTE — PROGRESS NOTES
Patient states she does not want to use the nitrous, was not helping her she states and made her nauseated.  Metocloperamide IV given.

## 2024-06-19 NOTE — PROVIDER NOTIFICATION
Patient had a large clot measuring 302 ml with her check, for a total QBL of 421 ml. Resident notified and came and assessed patient in room.

## 2024-06-19 NOTE — PROGRESS NOTES
Feeling increasing intensity with contractions and feels like she could push.    Sterile vaginal exam  Cervical dilation 8cm  Cervical effacement 90%  Fetal station 0    Will allow a little more time for cervix to become complete  Nitrous oxide ordered per patient request

## 2024-06-20 VITALS
TEMPERATURE: 97.9 F | HEIGHT: 57 IN | RESPIRATION RATE: 16 BRPM | SYSTOLIC BLOOD PRESSURE: 81 MMHG | DIASTOLIC BLOOD PRESSURE: 54 MMHG | WEIGHT: 109.9 LBS | HEART RATE: 71 BPM | OXYGEN SATURATION: 97 % | BODY MASS INDEX: 23.71 KG/M2

## 2024-06-20 LAB — HGB BLD-MCNC: 8.7 G/DL (ref 11.7–15.7)

## 2024-06-20 PROCEDURE — 258N000003 HC RX IP 258 OP 636: Mod: JZ

## 2024-06-20 PROCEDURE — 85018 HEMOGLOBIN: CPT

## 2024-06-20 PROCEDURE — 250N000013 HC RX MED GY IP 250 OP 250 PS 637

## 2024-06-20 PROCEDURE — 36415 COLL VENOUS BLD VENIPUNCTURE: CPT

## 2024-06-20 RX ORDER — SODIUM CHLORIDE 9 MG/ML
INJECTION, SOLUTION INTRAVENOUS CONTINUOUS
Status: DISCONTINUED | OUTPATIENT
Start: 2024-06-20 | End: 2024-06-20

## 2024-06-20 RX ORDER — MODIFIED LANOLIN
OINTMENT (GRAM) TOPICAL
Status: SHIPPED
Start: 2024-06-20

## 2024-06-20 RX ORDER — IBUPROFEN 800 MG/1
800 TABLET, FILM COATED ORAL EVERY 6 HOURS PRN
Status: SHIPPED
Start: 2024-06-20

## 2024-06-20 RX ADMIN — SODIUM CHLORIDE: 9 INJECTION, SOLUTION INTRAVENOUS at 03:26

## 2024-06-20 RX ADMIN — DOCUSATE SODIUM 100 MG: 100 CAPSULE, LIQUID FILLED ORAL at 08:22

## 2024-06-20 RX ADMIN — ACETAMINOPHEN 650 MG: 325 TABLET ORAL at 08:22

## 2024-06-20 RX ADMIN — ACETAMINOPHEN 650 MG: 325 TABLET ORAL at 12:29

## 2024-06-20 RX ADMIN — SODIUM CHLORIDE, POTASSIUM CHLORIDE, SODIUM LACTATE AND CALCIUM CHLORIDE 1000 ML: 600; 310; 30; 20 INJECTION, SOLUTION INTRAVENOUS at 09:28

## 2024-06-20 ASSESSMENT — ACTIVITIES OF DAILY LIVING (ADL)
ADLS_ACUITY_SCORE: 18

## 2024-06-20 NOTE — PROGRESS NOTES
8:14 PM    Paged by RN about patient concern of feeling dizzy/lightheaded.     Patient is a  who delivered at 11:28 AM this morning via . She received pitocin after delivery. Later this afternoon, was noted to pass some larger clots and underwent sweep of lower uterine segment was given methergine and Cytotec. Total blood loss calculated at this point: 785 mL    Prenatal course notable for anemia of pregnancy superimposed on alpha thalassemia trait (received venofer infusions x3).     Patient vitally stable, but with softer BP (as she has appeared throughout admission thus far). Due to new symptoms of dizziness and lightheadedness, will obtain Hgb.       Discussed with bedside RN

## 2024-06-20 NOTE — DISCHARGE SUMMARY
"OB Discharge Summary  Date:  2024    Name:  Shayna Jerry  :  1995  MRN:  [id number]    Admission Date:  2024  Delivery Date:  2024 11:19 AM   Gestational Age at Delivery:  39w2d  Discharge Date:      Principal Diagnosis:    Problem List Items Addressed This Visit          Hematologic    Anemia during pregnancy in second trimester - Primary    Relevant Orders    Breast Pump Order for DME - ONLY FOR DME       Other    Normal labor and delivery    Relevant Medications    benzocaine-menthol (DERMOPLAST) 20-0.5 % AERO    ibuprofen (ADVIL/MOTRIN) 800 MG tablet    lanolin ointment    witch hazel-glycerin (TUCKS) pad    Other Relevant Orders    Breast Pump Order for DME - ONLY FOR DME     Other Visit Diagnoses       Lactation symptom        Relevant Orders    Breast Pump Order for DME - ONLY FOR DME          Other Diagnosis:  Anemia - alpha thalassemia trait plus iron deficiency. Hypotension, asymptomatic - 80s/40s, received a few IV fluid boluses, hemoglobin on discharge was 8.7 down from baseline in the 9s, runs low 90s/50s in clinic, asymptomatic.    Conditions complicating Pregnancy: Nutrition:  Iron deficiency Anemia  Postpartum blood loss: Vaginal Delivery with postpartum hemorrhage greater than 500 ml EBL    Procedure(s) Performed:       Condition at Discharge:  good    Examination:  BP (!) 81/54 (BP Location: Right arm, Patient Position: Semi-Pearson's, Cuff Size: Adult Regular)   Pulse 71   Temp 97.9  F (36.6  C) (Oral)   Resp 16   Ht 1.448 m (4' 9\")   Wt 49.9 kg (109 lb 14.4 oz)   LMP 2023 (Exact Date)   SpO2 97%   Breastfeeding Unknown   BMI 23.78 kg/m     GEN: Patient sitting comfortably in no acute distress.  HEEN: Head is atraumatic, normocephalic, eyes anicteric, mucous membranes moist.  CV: Regular rate and rhythm without obvious murmurs.  PULM: Clear to auscultation bilaterally without wheezing or rales.  ABD: Soft, nontender, fundus is firm below the umbilicus.  EXT: " no lower extremity swelling.    Discharge Medications:      Review of your medicines        START taking        Dose / Directions   benzocaine-menthol 20-0.5 % Aero  Commonly known as: DERMOPLAST      Dose: 1 applicator  Apply 1 g topically 4 times daily as needed (perineal pain)  Refills: 0     ibuprofen 800 MG tablet  Commonly known as: ADVIL/MOTRIN      Dose: 800 mg  Take 1 tablet (800 mg) by mouth every 6 hours as needed for other (cramping)  Refills: 0     lanolin ointment      Apply topically every hour as needed for other (sore nipples)  Refills: 0     witch hazel-glycerin pad  Commonly known as: TUCKS      Apply topically every hour as needed for hemorrhoids or other (episiotomy pain/itching)  Refills: 0            CONTINUE these medicines which have NOT CHANGED        Dose / Directions   prenatal multivitamin w/iron 27-0.8 MG tablet      Dose: 1 tablet  Take 1 tablet by mouth daily  Quantity: 90 tablet  Refills: 3              Discharge Plan:   Follow up with Dr. Lizama in 2 and 6 weeks.  Regular diet.   Ibuprofen as needed for pain.  Avoid vaginal intercourse for 6 weeks.   Referral to lactation.     Maryam Lizama MD  [facility], PGY-3  Contact via [employer].    Precepted patient with Dr. Manuel Rod.

## 2024-06-20 NOTE — PLAN OF CARE
Shayna's VSS with one lower pressure overnight. See progress note. Pain well controlled with PRN tylenol. Fundal assessment and bleeding WNL. Up and independent. Her and  are attentive to infant. Would like to discharge today.  Problem: Adult Inpatient Plan of Care  Goal: Plan of Care Review  Description: The Plan of Care Review/Shift note should be completed every shift.  The Outcome Evaluation is a brief statement about your assessment that the patient is improving, declining, or no change.  This information will be displayed automatically on your shift  note.  Outcome: Progressing  Goal: Absence of Hospital-Acquired Illness or Injury  Outcome: Progressing  Intervention: Prevent Skin Injury  Recent Flowsheet Documentation  Taken 6/20/2024 0249 by Luz Elena Farah RN  Body Position: position changed independently  Taken 6/19/2024 1948 by Luz Elena Farah RN  Body Position: position changed independently  Intervention: Prevent Infection  Recent Flowsheet Documentation  Taken 6/20/2024 0249 by Luz Elena Farah, JENN  Infection Prevention:   environmental surveillance performed   hand hygiene promoted   rest/sleep promoted  Taken 6/19/2024 1948 by Luz Elena Farah RN  Infection Prevention:   environmental surveillance performed   hand hygiene promoted   rest/sleep promoted  Goal: Optimal Comfort and Wellbeing  Outcome: Progressing  Intervention: Monitor Pain and Promote Comfort  Recent Flowsheet Documentation  Taken 6/19/2024 2004 by Luz Elena Farah RN  Pain Management Interventions:   medication (see MAR)   pain management plan reviewed with patient/caregiver   rest  Intervention: Provide Person-Centered Care  Recent Flowsheet Documentation  Taken 6/20/2024 0249 by Luz Elena Farah, RN  Trust Relationship/Rapport:   care explained   choices provided   emotional support provided   questions answered   questions encouraged   reassurance provided  Taken 6/19/2024 1948 by Luz Elena Farah RN  Trust Relationship/Rapport:   care  explained   choices provided   emotional support provided   questions answered   questions encouraged   reassurance provided  Goal: Readiness for Transition of Care  Outcome: Progressing     Problem: Postpartum (Vaginal Delivery)  Goal: Successful Parent Role Transition  Outcome: Progressing  Intervention: Support Parent Role Transition  Recent Flowsheet Documentation  Taken 6/20/2024 0249 by Luz Elena Farah RN  Supportive Measures:   active listening utilized   decision-making supported   relaxation techniques promoted   self-care encouraged   verbalization of feelings encouraged  Parent-Child Attachment Promotion:   rooming-in promoted   participation in care promoted   caring behavior modeled  Taken 6/19/2024 1948 by Luz Elena Farah RN  Supportive Measures:   active listening utilized   decision-making supported   relaxation techniques promoted   self-care encouraged   verbalization of feelings encouraged  Parent-Child Attachment Promotion:   rooming-in promoted   participation in care promoted   caring behavior modeled  Goal: Hemostasis  Outcome: Progressing  Goal: Absence of Infection Signs and Symptoms  Outcome: Progressing  Goal: Optimal Pain Control and Function  Outcome: Progressing  Intervention: Prevent or Manage Pain  Recent Flowsheet Documentation  Taken 6/20/2024 0249 by Luz Elena Farah RN  Perineal Care:   perineal spray bottle/warm water use encouraged   perineal hygiene encouraged   perineum cleansed  Taken 6/19/2024 2004 by Luz Elena Farah, RN  Pain Management Interventions:   medication (see MAR)   pain management plan reviewed with patient/caregiver   rest  Taken 6/19/2024 1948 by Luz Elena Farah RN  Perineal Care:   perineal spray bottle/warm water use encouraged   perineal hygiene encouraged   perineum cleansed  Goal: Effective Urinary Elimination  Outcome: Progressing  Intervention: Monitor and Manage Urinary Retention  Recent Flowsheet Documentation  Taken 6/20/2024 0249 by Luz Elena Farah  RN  Urinary Elimination Promotion: frequent voiding encouraged  Taken 6/19/2024 1948 by Luz Elena Farah, RN  Urinary Elimination Promotion: frequent voiding encouraged

## 2024-06-20 NOTE — PROVIDER NOTIFICATION
Notified Dr. Flores that the pt's bp was 84/55, it was rechecked 15 minutes later and was 95/68/ Pt c/o dizziness.     Hgb check ordered and 1 L fluid bolus ordered by Dr. Lizama.

## 2024-06-20 NOTE — PROVIDER NOTIFICATION
Patient had blood pressure of 82/55 at 0249 and 84/56 at 0303. Dr. Aguilar notified. Order for NS infusion.

## 2024-06-20 NOTE — DISCHARGE INSTRUCTIONS
Warning Signs after Having a Baby    Keep this paper on your fridge or somewhere else where you can see it.    Call your provider if you have any of these symptoms up to 12 weeks after having your baby.    Thoughts of hurting yourself or your baby  Pain in your chest or trouble breathing  Severe headache not helped by pain medicine  Eyesight concerns (blurry vision, seeing spots or flashes of light, other changes to eyesight)  Fainting, shaking or other signs of a seizure    Call 9-1-1 if you feel that it is an emergency.     The symptoms below can happen to anyone after giving birth. They can be very serious. Call your provider if you have any of these warning signs.    My provider s phone number: _______________________    Losing too much blood (hemorrhage)    Call your provider if you soak through a pad in less than an hour or pass blood clots bigger than a golf ball. These may be signs that you are bleeding too much.    Blood clots in the legs or lungs    After you give birth, your body naturally clots its blood to help prevent blood loss. Sometimes this increased clotting can happen in other areas of the body, like the legs or lungs. This can block your blood flow and be very dangerous.     Call your provider if you:  Have a red, swollen spot on the back of your leg that is warm or painful when you touch it.   Are coughing up blood.     Infection    Call your provider if you have any of these symptoms:  Fever of 100.4 F (38 C) or higher.  Pain or redness around your stitches if you had an incision.   Any yellow, white, or green fluid coming from places where you had stitches or surgery.    Mood Problems (postpartum depression)    Many people feel sad or have mood changes after having a baby. But for some people, these mood swings are worse.     Call your provider right away if you feel so anxious or nervous that you can't care for yourself or your baby.    Preeclampsia (high blood pressure)    Even if you  didn't have high blood pressure when you were pregnant, you are at risk for the high blood pressure disease called preeclampsia. This risk can last up to 12 weeks after giving birth.     Call your provider if you have:   Pain on your right side under your rib cage  Sudden swelling in the hands and face    Remember: You know your body. If something doesn't feel right, get medical help.     For informational purposes only. Not to replace the advice of your health care provider. Copyright 2020 [facility]. All rights reserved. Clinically reviewed by Shawnee Prescott, RNC-OB, MSN. [employer] 912136 - Rev 02/23.

## 2024-06-20 NOTE — LACTATION NOTE
This note was copied from a baby's chart.  Reason for Initial Lactation Visit: Lactation consultant to patient room to assess breastfeeding goals and offer support.  is 24-hours old at the time of this visit. Mother reports that  has had a few latches at breast for a short time and a few attempts. [place] is being supplemented with formula.     Breastfeeding goals: Breastfeeding for a few months with formula supplementation at needed.     Past breastfeeding experience: Previous babies breast fed for two weeks.    Maternal health risks that may affect breastfeeding: Iron deficiency anemia.    Pump for home use: Medela Max Flow (new pump was distributed at this time).    Breast assessment: Soft breast tissue, everted nipples with no complaints of soreness,    Education:Instructed on benefit of skin to skin prior to feeding to waken and ready for feeding, importance of feeding baby on early hunger cues, and breastfeeding 8-12 times, both breasts, in 24 hours for adequate infant nutrition and hydration as well as for building an optimal milk supply. Education given on importance of optimal infant positioning for deep, comfortable latch and effective milk transfer. Instructed on techniques for keeping infant actively sucking, including breast compressions. Anticipatory guidance given on input/output goals, normal feeding volumes in the  period, and pumping.     Assessment/Intervention: Mother was assisted with hand expression, and a small amount of colostrum was used to arouse infant for breastfeeding. Mother was assisted with positioning  in a cross-cradle hold on the left breast. A fair latch was achieved with a few audible swallows, however,  continued to be sleepy at breast. Techniques to keep  active at breast including deep breast compression was demonstrated. Mother was assisted with more hand expression and  was spoon/finger fed (approximately 8 ml total). The plan  of care (below) was provided and reviewed with parents.     Care Plan - Latch Difficulty     Place baby skin to skin on mom's chest up to an hour before feeding.   Attempt breastfeeding on infant's early hunger cues (hand in mouth, rooting).  Position baby in cross cradle or football hold, which may help achieve latch.   If latched, watch for rhythmic sucking and occasional swallows.  Limit latch attempts to 5-10 minutes.  If latch difficulty or few/no swallows noted:  Hand express colostrum and offer via spoon before or after feeding attempt to increase baby's energy level.  Pump breasts for 15 minutes to stimulate milk production.   Feed expressed milk to baby using the amounts below as a guideline, give more as baby cues.  If necessary, make up the difference with donor milk or formula as a bridge until milk supply increases:    0-24 hours     2-10 ml each feeding (may use spoon)  24-48 hours   5-15 ml each feeding  48-72 hours   15-30 ml each feeding  72-96 hours   30-60 ml each feeding     Supplementing/Method: Formula/Bottle    Pumping Plan: Pump after feedings if  receives a supplement (a supplement of formula = a pumping session) to promote an optimal milk supply.     Other interventions/instructions: Possible discharge later today.  Reviewed online and outpatient lactation resources for breastfeeding help after discharge. Answered questions and gave encouragement.      Parents instructed to request for lactation assistance with feedings as needed today. They are aware this writer/consultant is here until 3:30 PM.

## 2024-06-20 NOTE — PLAN OF CARE
Problem: Adult Inpatient Plan of Care  Goal: Plan of Care Review  Outcome: Met    VSS, ex remains hypotensive, MD notified and 1 L LR administered per orders. Hgb 8.7. Pt reported feeling dizzy at the start of the shift but the pt denied dizziness the rest of the shift after the LR bolus. Ambulating independently in the room. Pt reports tolerable pain control with tylenol use, pt declined prn ibuprofen when offered. BF and supplementing with formula. Discharge instructions and warnings signs given to pt and significant other and pt and significant other verbalized understanding and denied questions at this time. ong  utilized.

## 2024-06-20 NOTE — PROVIDER NOTIFICATION
Notified Dr. Lizama that the pt's bp is now 81/54. Pt denies dizziness at this time. The pt is wondering about discharge.    Per Dr. Lizama: Pt is okay to discharge with last documented blood pressure. Will place discharge orders.

## 2024-06-20 NOTE — LACTATION NOTE
Lactation stopped by pt room to assess breastfeeding goals and offer support. Pt is sleeping at this time. Primary RN reports pt complains of dizziness while in bed. Will follow-up this afternoon.

## 2024-06-20 NOTE — PROVIDER NOTIFICATION
Notified Dr. Flores that the pt's Hgb is 8.7. 1L LR bolus given per orders. Do you want NS continued at 100mL/hr, which was running prior to bolus being administered?    Dr. Lizama discontinued NS infusion.

## 2024-06-26 NOTE — PROGRESS NOTES
"  Assessment & Plan     Routine postpartum follow-up  Having Some intermittent abdominal cramping, well-managed with Tylenol.  Getting better.  Bleeding has stopped.  No mood concerns.  Baby has not latched, but she is pumping and giving breastmilk by bottle, this is working well for them.    The longitudinal plan of care for the diagnosis(es)/condition(s) as documented were addressed during this visit. Due to the added complexity in care, I will continue to support Shayna in the subsequent management and with ongoing continuity of care.      No follow-ups on file.    Subjective   Shayna is a 28 year old, presenting for the following health issues:  Post-partum    HPI       Postpartum   Date of delivery was 24 via .  Complications noted during the pregnancy include:   Anemia - alpha thalassemia trait plus iron deficiency.   Complications at the time of delivery include   Hypotension, asymptomatic - 80s/40s, received a few IV fluid boluses, asymptomatic   hemoglobin on discharge was 8.7 down from baseline in the 9s    Abdominal pain   Cramping since delivery   Taking tylenol, helping   Not bleeding anymore      Mood  Denies concerns with postpartum blues/depression    Energy  Good, no significant fatigue    Breastfeeding   Breast/bottle feeding  Pumping and giving EB    Contraception  Has not resumed sexual activity   No return of period yet and no worrisome bleeding  Does not desire more children - not interested in talking about birth control today                 Objective    BP 97/65 (BP Location: Right arm)   Pulse 76   Temp 98.1  F (36.7  C) (Tympanic)   Resp 16   Ht 1.448 m (4' 9\")   Wt 44.9 kg (99 lb)   LMP 2023 (Exact Date)   SpO2 99%   Breastfeeding Yes   BMI 21.42 kg/m    Body mass index is 21.42 kg/m .  Physical Exam   GENERAL: healthy, alert and no distress  RESP: speaking in full sentences, normal work of breathing   CV: extremities appear well perfused  ABDOMEN: nondistended, mild " diffuse tenderness   MS: no gross musculoskeletal defects noted  PSYCH: mentation appears normal, affect normal/bright            Signed Electronically by: Curtis Spence MD

## 2024-06-28 ENCOUNTER — OFFICE VISIT (OUTPATIENT)
Dept: FAMILY MEDICINE | Facility: CLINIC | Age: 29
End: 2024-06-28
Payer: COMMERCIAL

## 2024-06-28 VITALS
BODY MASS INDEX: 21.36 KG/M2 | OXYGEN SATURATION: 99 % | DIASTOLIC BLOOD PRESSURE: 65 MMHG | SYSTOLIC BLOOD PRESSURE: 97 MMHG | HEART RATE: 76 BPM | HEIGHT: 57 IN | TEMPERATURE: 98.1 F | RESPIRATION RATE: 16 BRPM | WEIGHT: 99 LBS

## 2024-06-28 PROCEDURE — 99207 PR POST PARTUM EXAM: CPT | Mod: GC | Performed by: MASSAGE THERAPIST

## 2024-06-28 NOTE — PROGRESS NOTES
I have personally reviewed the history and examination as documented by Dr. Spence.  I was present during key portions of the visit and agree with the assessment and plan as documented for 28 yr old female here for postpartum visit. No acute issues. Precautions given. Anticipatory guidance given.     Andrew Lance MD  June 28, 2024  10:10 AM

## 2024-10-08 ENCOUNTER — OFFICE VISIT (OUTPATIENT)
Dept: FAMILY MEDICINE | Facility: CLINIC | Age: 29
End: 2024-10-08
Payer: COMMERCIAL

## 2024-10-08 VITALS
BODY MASS INDEX: 22.22 KG/M2 | SYSTOLIC BLOOD PRESSURE: 105 MMHG | HEIGHT: 57 IN | TEMPERATURE: 98.4 F | OXYGEN SATURATION: 98 % | HEART RATE: 82 BPM | WEIGHT: 103 LBS | RESPIRATION RATE: 20 BRPM | DIASTOLIC BLOOD PRESSURE: 72 MMHG

## 2024-10-08 DIAGNOSIS — H92.01 RIGHT EAR PAIN: ICD-10-CM

## 2024-10-08 DIAGNOSIS — H73.891 RETRACTED EAR DRUM, RIGHT: ICD-10-CM

## 2024-10-08 DIAGNOSIS — M54.2 NECK PAIN: ICD-10-CM

## 2024-10-08 DIAGNOSIS — R09.82 POST-NASAL DRIP: ICD-10-CM

## 2024-10-08 DIAGNOSIS — R10.31 ABDOMINAL PAIN, RIGHT LOWER QUADRANT: ICD-10-CM

## 2024-10-08 DIAGNOSIS — H91.91 HEARING LOSS OF RIGHT EAR, UNSPECIFIED HEARING LOSS TYPE: Primary | ICD-10-CM

## 2024-10-08 PROCEDURE — 99213 OFFICE O/P EST LOW 20 MIN: CPT | Mod: GC

## 2024-10-08 RX ORDER — FLUTICASONE PROPIONATE 50 MCG
1 SPRAY, SUSPENSION (ML) NASAL DAILY
Qty: 18.2 ML | Refills: 1 | Status: SHIPPED | OUTPATIENT
Start: 2024-10-08

## 2024-10-08 NOTE — PROGRESS NOTES
Assessment & Plan   Problem List Items Addressed This Visit    None  Visit Diagnoses       Hearing loss of right ear, unspecified hearing loss type    -  Primary    Relevant Orders    Adult Audiology  Referral    Adult ENT  Referral    Neck pain        Abdominal pain, right lower quadrant        Relevant Orders    Adult Colorectal Surgery  Referral    Right ear pain        Relevant Medications    fluticasone (FLONASE) 50 MCG/ACT nasal spray    Other Relevant Orders    Adult ENT  Referral    Retracted ear drum, right        Relevant Medications    fluticasone (FLONASE) 50 MCG/ACT nasal spray    Other Relevant Orders    Adult ENT  Referral    Post-nasal drip        Relevant Medications    fluticasone (FLONASE) 50 MCG/ACT nasal spray        Right Ear Pain  Right Neck Pain  Assessment: Patient presents with 10 years right neck and ear pain concerning for previous 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  Plan: Follow up with audiology for hearing assessment and ENT for assessment of prior TM rupture and hearing loss. Recommended PT referral for neck pain but patient declined due to schedule conflict at this time. Provided education on at-home remedies such as heat/ice, tylenol, and home stretching. Can use Flonase for post-nasal drip.     Abdominal Pain  Crohn Disease  [place]ol[place]-Kimbolton Fistula  Assessment: Patient presents with several years of daily RLQ and suprapubic abdominal pain. 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 Patient was unaware of this diagnosis during the visit today and chart review revealed this was never worked up.. Given abdominal pain preceded her salpingectomy and continues to persist, it is likely these diagnoses are the cause of her current abdominal pain.  Plan: Refer to  colorectal for work up of colo-colo fistula and crohn disease.    No follow-ups on file.      Subjective   Shayna is a 28 year old, presenting for the following health issues:  OTHER (Physical and pain right below the ear and would like to discuss with the doctor. ), Adonminal pain  (Come and goes. ), Had flu shot last week, and Say no to covid         10/8/2024    11:15 AM   Additional Questions   Roomed by Jasmin   Accompanied by          10/8/2024    Information    services provided? Yes   Language Hmong   Type of interpretation provided Face-to-face    name Suvone    Agency Jeanette Corral is a 28 year old female who presents today for right neck and ear pain. Patient states these symptoms began out of the blue 10 years ago. Denies trauma. Her neck pain was initially quite bothersome, but a massage at that time seemed to relieve some of her pain. Since this time, she has complained of a constant, tight, aching pain that originates below her ear and radiates to her shoulder and head, causing her a headache. Pain is worse with jaw movement, eating, and talking. Massage improves the pain. She has not tried any OTC medications. Endorses diminished hearing on the right and ear popping when she clenches her jaw. Endorses post-nasal drip. Endorses numbness and tingling down her right arm when she lays on her right side. Patient is interested in being referred for scans to understand what is causing this pain. 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 Denies heavy lifting at her job.    Patient additionally complains of abdominal pain to her RLQ and suprapubic region. This pain has been ongoing for the past several years. Pain occurs daily and is described as a stabbing sensation that is unrelieved by tylenol and will come and go on its own. No provoking or palliating factors. She has been eating and drinking normally.  "Endorses constipation (goes up to 1 week without a bowel movement). Denies diarrhea. Denies bloody stool.    Review of Systems  Constitutional, HEENT, cardiovascular, pulmonary, gi and gu systems are negative, except as otherwise noted.      Objective    /72   Pulse 82   Temp 98.4  F (36.9  C) (Tympanic)   Resp 20   Ht 1.436 m (4' 8.55\")   Wt 46.7 kg (103 lb)   LMP 08/05/2024 (Approximate)   SpO2 98%   BMI 22.65 kg/m    Body mass index is 22.65 kg/m .  Physical Exam   GENERAL: alert and no distress  EAR: Right TM with  retraction, and scarring. Left TM normal. No erythema bilaterally.   NECK: Tightness over right SCM. No adenopathy, no masses or scars  RESP: lungs clear to auscultation - no rales, rhonchi or wheezes  CV: regular rate and rhythm, normal S1 S2, no S3 or S4, no murmur, click or rub  ABDOMEN: Soft, non-distended. No masses. Mild tenderness to palpation of suprapubic region with radiation to epigastrium. No tenderness to palpation of epigastric region  MSK: Bilateral shoulders with full ROM with flexion, abduction, and adduction.  NEURO: No gross neurologic deficits. Normal strength with bilateral shoulder abduction, elbow flexion/extension. Normal sensation to upper extremities bilaterally. Hearing diminished on right.        Issa Denton, MS3    I was present with the medical student who participated in the service and in the documentation of this note. I have verified the history and personally performed the physical exam and medical decision making, and have verified the content of the note, which accurately reflects my assessment of the patient and the plan of care.     Karl Flores MD  [facility]      Signed Electronically by: Karl Flores MD    "

## 2024-10-08 NOTE — PATIENT INSTRUCTIONS
Referrals:    Audiologist - for hearing evaluation    Ear pain - Hearing doctor (ENT) for further evaluation    Abdominal pain - Colorectal specialist for evaluation for Crohn's.     Ear crackling - Start flonase (nasal steroid) to help with Eustacian tube dysfunction. Do this 1-2 times daily everyday going forward.

## 2024-10-09 ENCOUNTER — APPOINTMENT (OUTPATIENT)
Dept: INTERPRETER SERVICES | Facility: CLINIC | Age: 29
End: 2024-10-09
Payer: COMMERCIAL

## 2024-10-29 ENCOUNTER — PRE VISIT (OUTPATIENT)
Dept: SURGERY | Facility: CLINIC | Age: 29
End: 2024-10-29
Payer: COMMERCIAL

## 2024-10-29 NOTE — CONFIDENTIAL NOTE
COLON AND RECTAL SURGERY PRE-VISIT:  Diagnosis, Referred by & from: Abdominal pain. Referral per Dr. Flores.    Appt date: 11/14/2024 with Dr. Calvin at [facility]   NOTES STATUS DETAILS   OFFICE NOTE from referring provider Internal ealth:  10/8/2024- Dr. Flores   OFFICE NOTE from other specialist N/A    DISCHARGE SUMMARY from hospital N/A    DISCHARGE REPORT from the ER N/A    OPERATIVE REPORT N/A    MEDICATION LIST Internal ealth   LABS N/A    DIAGNOSTIC PROCEDURES N/A    IMAGING (DISC & REPORT) N/A       Records Requested   10/29/24  At 5:44 PM   No other records requested at this time. PRE-VISIT COMPLETE.        All relevant records are bookmarked under SILVA Gonzalez.  Nicanor Nair NREMT  Colon and Rectal Surgery

## 2024-11-12 NOTE — PROGRESS NOTES
Assessment & Plan     Normal audio, benign exam. Ipsine referral for jaw and neck,     Problem List Items Addressed This Visit    None  Visit Diagnoses       TMJ (temporomandibular joint syndrome)    -  Primary    Cervicalgia                 Review of external notes as documented elsewhere in note    7 minutes spent on the date of the encounter doing chart review, history and exam, [id number] and further activities per the note  {     JEREMIAH Hong  [facility]    Subjective     HPI-  Recent relevant appt:  Audio @ 1120. Hearing loss of right ear. Hx of prior TM rupture and hearing loss. 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  Plan: Follow up with audiology for hearing assessment and ENT for assessment of prior TM rupture and hearing loss. Recommended PT referral for neck pain but patient declined due to schedule conflict at this time. Provided education on at-home remedies such as heat/ice, tylenol, and home stretching. Can use Flonase for post-nasal drip.     HPI   Shayna is a 28 year old female who presents today for right neck and ear pain. Patient states these symptoms began out of the blue 10 years ago. Denies trauma. Her neck pain was initially quite bothersome, but a massage at that time seemed to relieve some of her pain. Since this time, she has complained of a constant, tight, aching pain that originates below her ear and radiates to her shoulder and head, causing her a headache. Pain is worse with jaw movement, eating, and talking. Massage improves the pain. She has not tried any OTC medications. Endorses diminished hearing on the right and ear popping when she clenches her jaw. Endorses post-nasal drip.  Endorses numbness and tingling down her right arm when she lays on her right side. Patient is interested in being referred for scans to understand what is causing this pain. Patient works on a [employer] line where she performs light work. Denies heavy lifting at her job.    Audio:  Patient reports decreased hearing and pain/ tightness behind her right ear for the past 8 years. She reports she has throbbing pain in the right at times, but other times it feels like her muscles behind the right ear and into her jaw are tight. She does have some popping with her jaw on the right at times. She has intermittent buzzing tinnitus and aural fullness in the right ear. She reports intermittent episodes of feeling lightheaded. She reports her paternal uncle has age related hearing loss. She denies otorrhea, past ear surgery, noise exposure, and prior use of amplification. RESULTS: Otoscopy: clear ear canals bilaterally. Tymps: Type A bilaterally. Reflexes: Present right and left ipsi; CNT contra due to equipment limitations. Audio: Normal hearing bilaterally.    Today reports jaw tires easily.      Interpretor present throughout visit.        Review of Systems   ENT as above      Objective    LMP 08/05/2024 (Approximate)     Physical Exam     Constitutional:   The patient was in no acute distress.      Head/Face:   Normocephalic and atraumatic.  No lesions or scars.     Ears:  The tympanic membranes are normal in appearance, bony landmarks are intact.  No retraction, perforation, or masses.   No fluid or purulence was seen in the external canal or the middle ear. No evidence of infection of the middle ear or external canal, cerumen was normal in appearance.    Nose:  Anterior rhinoscopy revealed midline septum and absence of purulence or polyps.      Mouth:  Normal tongue, floor of mouth, buccal mucosa, and palate.  No lesions, ulceration or  masses on inspection, normal voice quality      Oropharynx:  Normal  mucosa, palate symmetric with normal elevation. Tonsils  2+ Pterygoid region non-tender w/o crepitus.     Neck:  Supple with normal laryngeal and tracheal landmarks. No palpable thyroid.     Lymphatic:  There is no palpable lymphadenopathy in the neck.

## 2024-11-19 ENCOUNTER — OFFICE VISIT (OUTPATIENT)
Dept: OTOLARYNGOLOGY | Facility: CLINIC | Age: 29
End: 2024-11-19
Payer: COMMERCIAL

## 2024-11-19 ENCOUNTER — OFFICE VISIT (OUTPATIENT)
Dept: AUDIOLOGY | Facility: CLINIC | Age: 29
End: 2024-11-19
Payer: COMMERCIAL

## 2024-11-19 DIAGNOSIS — H92.01 PAIN IN RIGHT EAR: Primary | ICD-10-CM

## 2024-11-19 DIAGNOSIS — H91.91 HEARING LOSS OF RIGHT EAR, UNSPECIFIED HEARING LOSS TYPE: ICD-10-CM

## 2024-11-19 DIAGNOSIS — M26.609 TMJ (TEMPOROMANDIBULAR JOINT SYNDROME): Primary | ICD-10-CM

## 2024-11-19 DIAGNOSIS — M54.2 CERVICALGIA: ICD-10-CM

## 2024-11-19 PROCEDURE — 92550 TYMPANOMETRY & REFLEX THRESH: CPT | Mod: 52 | Performed by: AUDIOLOGIST

## 2024-11-19 PROCEDURE — 99203 OFFICE O/P NEW LOW 30 MIN: CPT | Performed by: PHYSICIAN ASSISTANT

## 2024-11-19 PROCEDURE — 92557 COMPREHENSIVE HEARING TEST: CPT | Performed by: AUDIOLOGIST

## 2024-11-19 NOTE — LETTER
11/19/2024      Shayna Jerry  [address] [place] [address]      Dear Colleague,    Thank you for referring your patient, Shayna Jerry, to the [facility]. Please see a copy of my visit note below.    Assessment & Plan    Normal audio, benign exam. Ipsine referral for jaw and neck,     Problem List Items Addressed This Visit    None  Visit Diagnoses       TMJ (temporomandibular joint syndrome)    -  Primary    Cervicalgia                 Review of external notes as documented elsewhere in note    7 minutes spent on the date of the encounter doing chart review, history and exam, [id number] and further activities per the note  {     JEREMIAH Hong  [facility]    Subjective     HPI-  Recent relevant appt:  Audio @ 1120. Hearing loss of right ear. Hx of prior TM rupture and hearing loss. 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  Plan: Follow up with audiology for hearing assessment and ENT for assessment of prior TM rupture and hearing loss. Recommended PT referral for neck pain but patient declined due to schedule conflict at this time. Provided education on at-home remedies such as heat/ice, tylenol, and home stretching. Can use Flonase for post-nasal drip.     HPI   Shayna is a 28 year old female who presents today for right neck and ear pain. Patient states these symptoms began out of the blue 10 years ago. Denies trauma. Her neck pain was initially quite bothersome, but a massage at that time seemed to relieve some of her pain. Since this time, she has complained of a constant, tight, aching pain that originates below her ear and radiates to her shoulder and head, causing her a headache. Pain is  worse with jaw movement, eating, and talking. Massage improves the pain. She has not tried any OTC medications. Endorses diminished hearing on the right and ear popping when she clenches her jaw. Endorses post-nasal drip. Endorses numbness and tingling down her right arm when she lays on her right side. Patient is interested in being referred for scans to understand what is causing this pain. 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 Denies heavy lifting at her job.    Audio:  Patient reports decreased hearing and pain/ tightness behind her right ear for the past 8 years. She reports she has throbbing pain in the right at times, but other times it feels like her muscles behind the right ear and into her jaw are tight. She does have some popping with her jaw on the right at times. She has intermittent buzzing tinnitus and aural fullness in the right ear. She reports intermittent episodes of feeling lightheaded. She reports her paternal uncle has age related hearing loss. She denies otorrhea, past ear surgery, noise exposure, and prior use of amplification. RESULTS: Otoscopy: clear ear canals bilaterally. Tymps: Type A bilaterally. Reflexes: Present right and left ipsi; CNT contra due to equipment limitations. Audio: Normal hearing bilaterally.    Today reports jaw tires easily.      Interpretor present throughout visit.        Review of Systems   ENT as above      Objective    LMP 08/05/2024 (Approximate)     Physical Exam     Constitutional:   The patient was in no acute distress.      Head/Face:   Normocephalic and atraumatic.  No lesions or scars.     Ears:  The tympanic membranes are normal in appearance, bony landmarks are intact.  No retraction, perforation, or masses.   No fluid or purulence was seen in the external canal or the middle ear. No evidence of infection of the middle ear or external canal, cerumen was normal in appearance.    Nose:  Anterior rhinoscopy revealed  midline septum and absence of purulence or polyps.      Mouth:  Normal tongue, floor of mouth, buccal mucosa, and palate.  No lesions, ulceration or  masses on inspection, normal voice quality      Oropharynx:  Normal mucosa, palate symmetric with normal elevation. Tonsils  2+ Pterygoid region non-tender w/o crepitus.     Neck:  Supple with normal laryngeal and tracheal landmarks. No palpable thyroid.     Lymphatic:  There is no palpable lymphadenopathy in the neck.                    Again, thank you for allowing me to participate in the care of your patient.        Sincerely,        JEREMIAH Hong

## 2024-11-19 NOTE — PROGRESS NOTES
AUDIOLOGY REPORT     SUMMARY: Audiology visit completed. See audiogram for results.       RECOMMENDATIONS: Follow-up with ENT.    Toño Cotton, CCC-A  [place] Licensed Audiologist #[id number]

## 2024-11-19 NOTE — PATIENT INSTRUCTIONS
Temporomandibular Disorder: Care Instructions  Overview     Temporomandibular disorders (TMDs) are problems with the muscles and joints that connect your jaw to your skull. These problems cause pain when you talk, chew, swallow, or yawn. You may feel this pain on one or both sides.  TMDs are often caused by tight jaw muscles. The tightness can be caused by clenching or grinding your teeth.  Lowering stress may help relax your jaw and reduce your pain. Your doctor may suggest a dental splint. Splints can help protect the teeth from grinding and clenching.  You may be able to do some things at home to feel better. If that doesn't work for you, your doctor may prescribe medicine to help relax your muscles and control the pain.  Follow-up care is a key part of your treatment and safety. Be sure to make and go to all appointments, and call your doctor if you are having problems. It's also a good idea to know your test results and keep a list of the medicines you take.  How can you care for yourself at home?  Put an ice pack or a warm, moist cloth on your jaw for 15 minutes. Do this several times a day. Try switching back and forth between moist heat and cold.  Search for [facility] Youtube videos on TMJ exercises you can do at home.    Ask your doctor if you can take an over-the-counter pain medicine, such as acetaminophen (Tylenol), ibuprofen (Advil, Motrin), or naproxen (Aleve). Be safe with medicines. Read and follow all instructions on the label.  Choose softer foods, such as eggs, yogurt, soup, or pureed foods. Try to avoid hard foods. Cut food into small pieces.  If it doesn't cause pain, practice relaxing your jaw. Gently open and close your mouth. Move your jaw straight up and down. Do this for a few minutes every morning and evening. Watching yourself in a mirror can help.  Have good posture. Try to line up your ears, shoulders, and hips when sitting and standing.  Learn to manage your stress.  "Try:  Relaxation techniques. These may include taking slow, deep breaths, and mindful meditation. It may include progressive muscle relaxation, yoga, magdaleno chi, and qi gong.  Getting at least 30 minutes of exercise on most days to relieve stress. Try walking.  Try not to:  Hold a phone between your shoulder and your jaw.  Open your mouth all the way, like when you sing loudly or yawn.  Clench or grind your teeth, bite your lips, or chew your fingernails.  Clench things between your teeth, such as pens, pipes, or cigars.  When should you call for help?   Call your doctor now or seek immediate medical care if:    Your jaw is locked open or shut or it is hard to move your jaw.   Watch closely for changes in your health, and be sure to contact your doctor if:    Your jaw pain gets worse.     Your face is swollen.     You do not get better as expected.   Where can you learn more?  Go to https://www.[employer].net/patiented  Enter P868 in the search box to learn more about \"Temporomandibular Disorder: Care Instructions.\"  Current as of: August 6, 2023               Content Version: 14.0    8104-5123 [employer].   Care instructions adapted under license by your healthcare professional. If you have questions about a medical condition or this instruction, always ask your healthcare professional. [employer] disclaims any warranty or liability for your use of this information.   "

## 2024-11-25 ENCOUNTER — MEDICAL CORRESPONDENCE (OUTPATIENT)
Dept: HEALTH INFORMATION MANAGEMENT | Facility: CLINIC | Age: 29
End: 2024-11-25
Payer: COMMERCIAL

## 2024-11-26 ENCOUNTER — APPOINTMENT (OUTPATIENT)
Dept: INTERPRETER SERVICES | Facility: CLINIC | Age: 29
End: 2024-11-26
Payer: COMMERCIAL

## 2024-11-26 NOTE — TELEPHONE ENCOUNTER
REFERRAL INFORMATION:  Referring Provider:  Leila Gandhi MD   Referring Clinic:  [facility]   Reason for Visit/Diagnosis: K50.90 (ICD-10-CM) - Crohn's disease without complication, unspecified gastrointestinal tract location (H)      FUTURE VISIT INFORMATION:  Appointment Date: 12/18/24     NOTES STATUS DETAILS   OFFICE NOTE from Referring Provider Internal/CE 11/21/24, 10/8/24    ED 12/24/22-[facility], [place], [place]    ED 3/14/21-[facility] [place]   MEDICATION LIST Internal    PROCEDURES     STOOL TESTING     LABS     PERTINENT LABS Internal    IMAGES     CT In process 12/24/22, 3/14/21-CT abdomen pelvis     Records Requested     November 26, 2024 7:48 AM  [email or web address]   Facility  [place], [phone] # [phone]   Outcome Requested images

## 2024-12-18 ENCOUNTER — PRE VISIT (OUTPATIENT)
Dept: GASTROENTEROLOGY | Facility: CLINIC | Age: 29
End: 2024-12-18

## 2025-07-11 ENCOUNTER — OFFICE VISIT (OUTPATIENT)
Dept: FAMILY MEDICINE | Facility: CLINIC | Age: 30
End: 2025-07-11
Payer: COMMERCIAL

## 2025-07-11 VITALS
DIASTOLIC BLOOD PRESSURE: 78 MMHG | RESPIRATION RATE: 20 BRPM | OXYGEN SATURATION: 95 % | TEMPERATURE: 98.1 F | WEIGHT: 109 LBS | SYSTOLIC BLOOD PRESSURE: 110 MMHG | BODY MASS INDEX: 22.88 KG/M2 | HEIGHT: 58 IN | HEART RATE: 87 BPM

## 2025-07-11 DIAGNOSIS — R09.82 POSTNASAL DRIP: ICD-10-CM

## 2025-07-11 DIAGNOSIS — L29.9 ITCHING: ICD-10-CM

## 2025-07-11 DIAGNOSIS — J30.2 SEASONAL ALLERGIC RHINITIS, UNSPECIFIED TRIGGER: ICD-10-CM

## 2025-07-11 DIAGNOSIS — K50.90 CROHN'S DISEASE WITHOUT COMPLICATION, UNSPECIFIED GASTROINTESTINAL TRACT LOCATION (H): Primary | ICD-10-CM

## 2025-07-11 RX ORDER — CETIRIZINE HYDROCHLORIDE 10 MG/1
10 TABLET ORAL DAILY
Qty: 90 TABLET | Refills: 0 | Status: SHIPPED | OUTPATIENT
Start: 2025-07-11

## 2025-07-11 RX ORDER — GUAIFENESIN 600 MG/1
1200 TABLET, EXTENDED RELEASE ORAL 2 TIMES DAILY PRN
Qty: 90 TABLET | Refills: 0 | Status: SHIPPED | OUTPATIENT
Start: 2025-07-11

## 2025-07-11 RX ORDER — HYDROCORTISONE 25 MG/G
CREAM TOPICAL 2 TIMES DAILY
Qty: 30 G | Refills: 0 | Status: SHIPPED | OUTPATIENT
Start: 2025-07-11

## 2025-07-11 NOTE — PROGRESS NOTES
Preceptor Attestation:   Patient seen, evaluated and discussed with the resident. I have verified the content of the note, which accurately reflects my assessment of the patient and the plan of care.    Supervising Physician:Leila Gandhi MD    [facility]

## 2025-07-11 NOTE — PROGRESS NOTES
Assessment & Plan     (K50.90) Crohn's disease without complication, unspecified gastrointestinal tract location (H)  (primary encounter diagnosis)  Comment:  Patient presents with several years of daily RLQ and suprapubic abdominal pain. 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 Patient was unaware of this diagnosis previously and chart review revealed this was never worked up. Given abdominal pain preceded her salpingectomy and continues to persist, it is likely these diagnoses are the cause of her current abdominal pain. Suspect Crohn's but needs follow up with GI.  Plan: Primary Care - Care Coordination Referral  - Referral in place for GI, gave patient phone number to call and schedule    (Z96.2) Seasonal allergic rhinitis, unspecified trigger  (R00.82) Postnasal drip  Comment: Long history of allergies causing post nasal drip and morning cough. Will treat with below and encouraged sustained continued use of flonase.   Plan: guaiFENesin (MUCINEX) 600 MG 12 hr tablet,         cetirizine (ZYRTEC) 10 MG tablet         - as above and do flonase daily for at least 2 weeks    Ithcy scalp  Comment: 2 x 3 cm hyperkeratoic lesion with scale back of scalp.   Plan:   - bid prn hydrocortisone cream         Diana Corral is a 29 year old, presenting for the following health issues:  No chief complaint on file.        7/11/2025    10:35 AM   Additional Questions   Roomed by mary   Accompanied by daughter         7/11/2025    Information    services provided? Yes   Language Hmong   Type of interpretation provided Face-to-face    name tria    Agency Jeanette King     HPI      Sore throat - medicatioin        Objective    LMP 07/04/2025 (Exact Date)   There is no height or weight on file to calculate BMI.  Physical Exam   GENERAL:  Healthy, alert and no distress  EYES: Eyes grossly normal to inspection.  No discharge or erythema, or obvious scleral/conjunctival abnormalities.  RESP:  Breathing comfortably on room air. Speaking in full sentences without difficulty.    CV: Appears well perfused.   MSK: No gross deformity. Normal tone.  SKIN: Visible skin clear. No significant rash, abnormal pigmentation or lesions.  NEURO: Cranial nerves grossly intact.  Mentation and speech appropriate for age.  PSYCH: Mentation appears normal, affect normal/bright, judgement and insight intact, normal speech and appearance well-groomed.            Signed Electronically by: Karl Flores MD

## (undated) DEVICE — PROTECTOR ARM STANDARD ONE STEP

## (undated) DEVICE — PREP CHLORAPREP 26ML TINTED HI-LITE ORANGE 930815

## (undated) DEVICE — GLOVE UNDER INDICATOR PI SZ 6 LF 41660

## (undated) DEVICE — ESU LIGASURE LAPAROSCOPIC BLUNT TIP SEALER 5MMX37CM LF1837

## (undated) DEVICE — ENDO POUCH UNIVERSAL RETRIEVAL SYSTEM INZII 5MM CD003

## (undated) DEVICE — PREP POVIDONE-IODINE 7.5% SCRUB 4OZ BOTTLE MDS093945

## (undated) DEVICE — SOL RINGERS LACTATED 1000ML BAG 2B2324X

## (undated) DEVICE — CUSTOM PACK PELVISCOPY SMA5BPVHEA

## (undated) DEVICE — SU MONOCRYL+ 4-0 18IN PS2 UND MCP496G

## (undated) DEVICE — PREP POVIDONE-IODINE 10% SOLUTION 4OZ BOTTLE MDS093944

## (undated) DEVICE — ESU HOLDER LAP INST DISP PURPLE LONG 330MM H-PRO-330

## (undated) DEVICE — Device

## (undated) DEVICE — SYRINGE 10ML FILL SALINE FLUSH STERILE 306553

## (undated) DEVICE — SU DERMABOND ADVANCED .7ML DNX12

## (undated) DEVICE — ENDO SHEARS RENEW LAP ENDOCUT SCISSOR TIP 16.5MM 3142

## (undated) DEVICE — SUCTION MANIFOLD NEPTUNE 2 SYS 1 PORT 702-025-000

## (undated) DEVICE — TUBING LAP SUCT/IRRIG STRYKER 250070500

## (undated) DEVICE — ENDO TROCAR SLEEVE KII Z-THREADED 05X100MM CTS02

## (undated) DEVICE — TROCAR ADV FIXATION NONBLADED 5X100MM

## (undated) DEVICE — ENDO TROCAR SLEEVE KII ADV FIXATION 05X100MM CFS02

## (undated) DEVICE — SOL WATER IRRIG 1000ML BOTTLE 2F7114

## (undated) DEVICE — GLOVE BIOGEL PI ULTRATOUCH G SZ 6.0 42160

## (undated) DEVICE — CATH FOLEY 16FR 5ML LUBRICATH LATEX 0165L16

## (undated) DEVICE — TUBING SMOKE EVAC PNEUMOCLEAR HIGH FLOW 0620050250

## (undated) RX ORDER — FENTANYL CITRATE 50 UG/ML
INJECTION, SOLUTION INTRAMUSCULAR; INTRAVENOUS
Status: DISPENSED
Start: 2023-07-21

## (undated) RX ORDER — PROPOFOL 10 MG/ML
INJECTION, EMULSION INTRAVENOUS
Status: DISPENSED
Start: 2023-07-21

## (undated) RX ORDER — ONDANSETRON 2 MG/ML
INJECTION INTRAMUSCULAR; INTRAVENOUS
Status: DISPENSED
Start: 2023-07-21

## (undated) RX ORDER — BUPIVACAINE HYDROCHLORIDE 2.5 MG/ML
INJECTION, SOLUTION EPIDURAL; INFILTRATION; INTRACAUDAL
Status: DISPENSED
Start: 2023-07-21